# Patient Record
Sex: MALE | Race: WHITE | Employment: UNEMPLOYED | URBAN - METROPOLITAN AREA
[De-identification: names, ages, dates, MRNs, and addresses within clinical notes are randomized per-mention and may not be internally consistent; named-entity substitution may affect disease eponyms.]

---

## 2017-01-16 ENCOUNTER — TELEPHONE (OUTPATIENT)
Dept: FAMILY MEDICINE CLINIC | Age: 32
End: 2017-01-16

## 2017-01-16 ENCOUNTER — OFFICE VISIT (OUTPATIENT)
Dept: FAMILY MEDICINE CLINIC | Age: 32
End: 2017-01-16

## 2017-01-16 VITALS
BODY MASS INDEX: 32.1 KG/M2 | SYSTOLIC BLOOD PRESSURE: 126 MMHG | HEART RATE: 92 BPM | RESPIRATION RATE: 18 BRPM | HEIGHT: 64 IN | OXYGEN SATURATION: 97 % | DIASTOLIC BLOOD PRESSURE: 89 MMHG | TEMPERATURE: 97.6 F | WEIGHT: 188 LBS

## 2017-01-16 DIAGNOSIS — J45.31 RAD (REACTIVE AIRWAY DISEASE), MILD PERSISTENT, WITH ACUTE EXACERBATION: ICD-10-CM

## 2017-01-16 DIAGNOSIS — R09.89 RHONCHI: ICD-10-CM

## 2017-01-16 DIAGNOSIS — J40 BRONCHITIS: ICD-10-CM

## 2017-01-16 DIAGNOSIS — J06.9 ACUTE URI: Primary | ICD-10-CM

## 2017-01-16 RX ORDER — NEBULIZER AND COMPRESSOR
EACH MISCELLANEOUS
Qty: 1 EACH | Refills: 0 | Status: SHIPPED | OUTPATIENT
Start: 2017-01-16 | End: 2017-01-19 | Stop reason: SDUPTHER

## 2017-01-16 RX ORDER — AMOXICILLIN AND CLAVULANATE POTASSIUM 875; 125 MG/1; MG/1
1 TABLET, FILM COATED ORAL 2 TIMES DAILY
Qty: 20 TAB | Refills: 0 | Status: SHIPPED | OUTPATIENT
Start: 2017-01-16 | End: 2017-01-26

## 2017-01-16 RX ORDER — ALBUTEROL SULFATE 0.83 MG/ML
2.5 SOLUTION RESPIRATORY (INHALATION) ONCE
Qty: 1 EACH | Refills: 0
Start: 2017-01-16 | End: 2017-01-16 | Stop reason: ALTCHOICE

## 2017-01-16 RX ORDER — ALBUTEROL SULFATE 90 UG/1
1-2 AEROSOL, METERED RESPIRATORY (INHALATION)
Qty: 1 INHALER | Refills: 2 | Status: SHIPPED | OUTPATIENT
Start: 2017-01-16 | End: 2017-01-16 | Stop reason: ALTCHOICE

## 2017-01-16 RX ORDER — ALBUTEROL SULFATE 0.83 MG/ML
2.5 SOLUTION RESPIRATORY (INHALATION)
Qty: 1 PACKAGE | Refills: 1 | Status: SHIPPED | OUTPATIENT
Start: 2017-01-16

## 2017-01-16 NOTE — PROGRESS NOTES
Chief Complaint   Patient presents with    Cold Symptoms     Patient in office today for sx that caregiver is unsure of when sx started; have not treated with otc. 1. Have you been to the ER, urgent care clinic since your last visit? Hospitalized since your last visit? No    2. Have you seen or consulted any other health care providers outside of the [address] [place] [address] since your last visit? Include any pap smears or colon screening.  No

## 2017-01-16 NOTE — TELEPHONE ENCOUNTER
Spoke with [facility] advised neb machine will be ordered for patient; please monitor calls from [address] in case they would like to speak with caregiver stated she understood. Advised neb solutions have been sent to [facility];stated she understood.

## 2017-01-16 NOTE — TELEPHONE ENCOUNTER
Teodora Can called stating that the spacer prescribed for the inhaler is not being covered by insurance, she would like to know what is the alternative? Please advise her @ [phone].

## 2017-01-16 NOTE — TELEPHONE ENCOUNTER
I don't know what a covered alternative would be. He just needs a spacer due to difficulty administering medication without due to history of MR. We may need to PA the spacer.

## 2017-01-16 NOTE — MR AVS SNAPSHOT
Visit Information Date & Time Provider Department Dept. Phone Encounter #  
 1/16/2017  1:30 PM Lianna Haney NP [facility] [phone] [id number] Follow-up Instructions Return if symptoms worsen or fail to improve. Your Appointments 2/14/2017  9:30 AM  
ESTABLISHED PATIENT with Ehsancarlos Shaw MD  
[address] [facility] ([facility]) Appt Note: 6mo fu dm  
 [address] [address] [phone]  
  
   
 [address] [address] Upcoming Health Maintenance Date Due Pneumococcal 19-64 Medium Risk (1 of 1 - PPSV23) 12/2/2003 DTaP/Tdap/Td series (1 - Tdap) 12/2/2005 EYE EXAM RETINAL OR DILATED Q1 3/19/2015 HEMOGLOBIN A1C Q6M 2/16/2017 FOOT EXAM Q1 3/2/2017 MICROALBUMIN Q1 3/2/2017 LIPID PANEL Q1 8/16/2017 Allergies as of 1/16/2017  Review Complete On: 1/16/2017 By: Lianna Haney NP No Known Allergies Current Immunizations  Reviewed on 12/9/2015 Name Date PPD 3/27/2012, 3/30/2011 TB Skin Test (PPD) Intradermal 3/17/2015, 3/21/2014, 4/9/2013, 4/5/2013 Not reviewed this visit You Were Diagnosed With   
  
 Codes Comments Acute URI    -  Primary ICD-10-CM: J06.9 ICD-9-CM: 465.9 Bronchitis     ICD-10-CM: J40 ICD-9-CM: 815 Rhonchi     ICD-10-CM: R06.89 
ICD-9-CM: 256. 7 Vitals BP Pulse Temp Resp Height(growth percentile) Weight(growth percentile) 126/89 (BP 1 Location: Right arm, BP Patient Position: Sitting) 92 97.6 °F (36.4 °C) (Oral) 18 5' 4\" (1.626 m) 188 lb (85.3 kg) SpO2 BMI Smoking Status 97% 32.27 kg/m2 Never Smoker BMI and BSA Data Body Mass Index Body Surface Area  
 32.27 kg/m 2 1.96 m 2 Preferred Pharmacy Pharmacy Name Phone [facility] [place] [address] [phone] Your Updated Medication List  
  
   
 This list is accurate as of: 1/16/17  2:19 PM.  Always use your most recent med list.  
  
  
  
  
 * albuterol 2.5 mg /3 mL (0.083 %) nebulizer solution Commonly known as:  PROVENTIL VENTOLIN  
3 mL by Nebulization route once for 1 dose. * albuterol 90 mcg/actuation inhaler Commonly known as:  PROVENTIL HFA, VENTOLIN HFA, PROAIR HFA Take 1-2 Puffs by inhalation every four (4) hours as needed for Wheezing. amoxicillin-clavulanate 875-125 mg per tablet Commonly known as:  AUGMENTIN Take 1 Tab by mouth two (2) times a day for 10 days. atenolol 25 mg tablet Commonly known as:  TENORMIN  
TAKE 1 TABLET BY MOUTH DAILY  
  
 bisacodyl 5 mg EC tablet Commonly known as:  DULCOLAX (BISACODYL) Take 1 Tab by mouth daily as needed for Constipation. diphenhydrAMINE 12.5 mg/5 mL Commonly known as:  BENADRYL Take 10 mL by mouth every six (6) hours as needed for Itching. divalproex  mg tablet Commonly known as:  DEPAKOTE  
  
 docusate sodium 100 mg capsule Commonly known as:  Morris Bough Take 1 Cap by mouth two (2) times daily as needed for Constipation. fluticasone 50 mcg/actuation nasal spray Commonly known as:  Emily Clarksdale INHALE 2 SPRAYS INTO EACH NOSTRIL ONCE DAILY  
  
 guaiFENesin-dextromethorphan 100-10 mg/5 mL syrup Commonly known as:  ROBITUSSIN-DM Take 10 mL by mouth four (4) times daily as needed for Cough. hydrocortisone 1 % ointment Commonly known as:  HYCORT Apply  to affected area two (2) times a day. use thin layer  
  
 ibuprofen 600 mg tablet Commonly known as:  MOTRIN Take  by mouth every six (6) hours as needed. inhalational spacing device Commonly known as:  SPACE CHAMBER PLUS  
1 Each by Does Not Apply route as needed. For use with albuterol inhaler. lithium carbonate 300 mg capsule Take 300 mg by mouth two (2) times daily (with meals). loratadine 10 mg tablet Commonly known as:  Michaell Organ  
 TAKE 1 TABLET BY MOUTH DAILY FOR ALLERGIES  
  
 magnesium hydroxide 400 mg/5 mL suspension Commonly known as:  MILK OF MAGNESIA Take 30 mL by mouth daily as needed. MYLANTA MAXIMUM STRENGTH 400-400-40 mg/5 mL suspension Generic drug:  aluminum & magnesium hydroxide-simethicone Take 10 mL by mouth every six (6) hours as needed. ondansetron hcl 4 mg tablet Commonly known as:  Tamara Footman Take 4 mg by mouth every six (6) hours. oxyCODONE-acetaminophen 5-325 mg per tablet Commonly known as:  PERCOCET Take 2 tablets by mouth every four (4) hours as needed for Pain. pramoxine-mineral oil-zinc 1-46.6-12.5 % rectal ointment Commonly known as:  TUCKS  
by PeriANAL route every four (4) hours as needed for Hemorrhoids, Pain and Itching. risperiDONE 4 mg tablet Commonly known as:  RisperDAL Take 2 mg by mouth daily. Senna 8.6 mg Cap Generic drug:  sennosides Take 1 tablet by mouth nightly as needed. temazepam 15 mg capsule Commonly known as:  RESTORIL Take  by mouth nightly as needed. TYLENOL 325 mg tablet Generic drug:  acetaminophen Take  by mouth every four (4) hours as needed. * Notice: This list has 2 medication(s) that are the same as other medications prescribed for you. Read the directions carefully, and ask your doctor or other care provider to review them with you. Prescriptions Sent to Pharmacy Refills  
 albuterol (PROVENTIL HFA, VENTOLIN HFA, PROAIR HFA) 90 mcg/actuation inhaler 2 Sig: Take 1-2 Puffs by inhalation every four (4) hours as needed for Wheezing. Class: Normal  
 Pharmacy: [address] [place] [address] #: [phone] Route: Inhalation  
 inhalational spacing device (SPACE CHAMBER PLUS) 1 Si Each by Does Not Apply route as needed. For use with albuterol inhaler.   
 Class: Normal  
 Pharmacy: [address] [place] [address], [address] [place] [address] [facility] #: [phone] Route: Does Not Apply  
 amoxicillin-clavulanate (AUGMENTIN) 875-125 mg per tablet 0 Sig: Take 1 Tab by mouth two (2) times a day for 10 days. Class: Normal  
 Pharmacy: [address] [place] [address], [address] Ph #: [phone] Route: Oral  
  
We Performed the Following ALBUTEROL, INHAL. SOL., FDA-APPROVED FINAL, NON-COMPOUND UNIT DOSE, 1 MG [ \[facility]\""] INHAL RX, AIRWAY OBST/DX SPUTUM INDUCT [id number] CPT(R)] Follow-up Instructions Return if symptoms worsen or fail to improve. Introducing [facility]! Saurabh Good introduces [employer] patient portal. Now you can access parts of your medical record, email your doctor's office, and request medication refills online. 1. In your internet browser, go to [email or web address] 2. Click on the First Time User? Click Here link in the Sign In box. You will see the New Member Sign Up page. 3. Enter your [employer] Access Code exactly as it appears below. You will not need to use this code after youve completed the sign-up process. If you do not sign up before the expiration date, you must request a new code. · [employer] Access Code: [email or web address] Expires: 3/15/2017  2:20 PM 
 
4. Enter the last four digits of your Social Security Number (xxxx) and Date of Birth (mm/dd/yyyy) as indicated and click Submit. You will be taken to the next sign-up page. 5. Create a [employer] ID. This will be your [employer] login ID and cannot be changed, so think of one that is secure and easy to remember. 6. Create a [employer] password. You can change your password at any time. 7. Enter your Password Reset Question and Answer. This can be used at a later time if you forget your password. 8. Enter your e-mail address. You will receive e-mail notification when new information is available in [address] 9. Click Sign Up. You can now view and download portions of your medical record. 10. Click the Download Summary menu link to download a portable copy of your medical information. If you have questions, please visit the Frequently Asked Questions section of the [employer] website. Remember, [employer] is NOT to be used for urgent needs. For medical emergencies, dial 911. Now available from your iPhone and Android! Please provide this summary of care documentation to your next provider. Your primary care clinician is listed as LULU HARPER. If you have any questions after today's visit, please call [phone].

## 2017-01-16 NOTE — PROGRESS NOTES
Chief Complaint   Patient presents with    Cold Symptoms     Patient in office today for sx that caregiver is unsure of when sx started; have not treated with otc. 1. Have you been to the ER, urgent care clinic since your last visit? Hospitalized since your last visit? No    2. Have you seen or consulted any other health care providers outside of the [address] [place] [address] since your last visit? Include any pap smears or colon screening. No    Denies any fever. Denies any PRNs. Sx started on Thursday with congestion and cough. Denies any recent abx. Denies any other concerns at this time. Chief Complaint   Patient presents with    Cold Symptoms     he is a 27y.o. year old male who presents for evalution. Reviewed PmHx, RxHx, FmHx, SocHx, AllgHx and updated and dated in the chart. Review of Systems - negative except as listed above in the HPI    Objective:     Vitals:    01/16/17 1340   BP: 126/89   Pulse: 92   Resp: 18   Temp: 97.6 °F (36.4 °C)   TempSrc: Oral   SpO2: 97%   Weight: 188 lb (85.3 kg)   Height: 5' 4\" (1.626 m)     Physical Examination: General appearance - alert, well appearing, and in no distress  Eyes - pupils equal and reactive, extraocular eye movements intact  Ears - bilateral TM's and external ear canals normal  Nose - mucosal congestion, mucosal erythema and purulent rhinorrhea  Mouth - mucous membranes moist, pharynx normal without lesions  Neck - supple, no significant adenopathy  Chest - no tachypnea, retractions or cyanosis, rhonchi noted on expiration throughout all lung fields, productive sounding cough; poor lung excursion due to pt not being able to take a deep breath on command  Heart - normal rate, regular rhythm, normal S1, S2, no murmurs    Assessment/ Plan:   Nuzhat Hinojosa was seen today for cold symptoms. Diagnoses and all orders for this visit:    Acute URI / Bronchitis  -     amoxicillin-clavulanate (AUGMENTIN) 875-125 mg per tablet;  Take 1 Tab by mouth two (2) times a day for 10 days. Start and complete full course of augmentin. Dwp ADRs/SEs of medication. Push fluids. Rest. Saline nasal spray for nasal congestion. OTC motrin/apap for fevers. RTC if sx persist or worsen. Rhonchi  -     Discontinue: albuterol (PROVENTIL VENTOLIN) 2.5 mg /3 mL (0.083 %) nebulizer solution; 3 mL by Nebulization route once for 1 dose. -     ALBUTEROL, INHAL. DSQ.()  -     INHAL RX, AIRWAY OBST/DX SPUTUM INDUCT ([id number])  Given. Tolerated breathing treatment. Reactive airway disease, mild persistent, with acute exacerbation   -     Nebulizer & Compressor machine; For the administration of albuterol every 4 hours as needed for wheezing. Diagnosis code R06.2.  -     albuterol (PROVENTIL VENTOLIN) 2.5 mg /3 mL (0.083 %) nebulizer solution; 3 mL by Nebulization route every four (4) hours as needed for Wheezing. Use as directed. Follow-up Disposition:  Return if symptoms worsen or fail to improve. I have discussed the diagnosis with the patient and the intended plan as seen in the above orders. The patient has received an after-visit summary and questions were answered concerning future plans. Medication Side Effects and Warnings were discussed with patient: yes  Patient Labs were reviewed and or requested: yes  Patient Past Records were reviewed and or requested  yes  Patient / Caregiver Understanding of treatment plan was verbalized during office visit YES    GOKUL CastellanosC    There are no Patient Instructions on file for this visit.

## 2017-01-18 ENCOUNTER — TELEPHONE (OUTPATIENT)
Dept: FAMILY MEDICINE CLINIC | Age: 32
End: 2017-01-18

## 2017-01-18 NOTE — TELEPHONE ENCOUNTER
Ms Buenrostro from [facility] states patient has not received nebullizer. Nikko Matos told he they needed more notes to get nebulizer stating why patient needs a nebulize. Fax to Nikko Matos.  Any questions call Ms Jass Puckett @[phone]

## 2017-01-18 NOTE — TELEPHONE ENCOUNTER
[address] called. Medicare will not pay for the neb if the diagnosis is wheezing. PSR asked if acute bronchitis will work, he stated no unless it was chronic bronchitis. PSR asked \"So Medicare will cover it for long term diagnosis and not temporary? \" He said correct. They will  Cover it for Chronic Bronchitis, COPD, Asthma, etc. They also need OV notes sent to send to Medicare as well.

## 2017-01-19 RX ORDER — NEBULIZER AND COMPRESSOR
EACH MISCELLANEOUS
Qty: 1 EACH | Refills: 0 | Status: SHIPPED | OUTPATIENT
Start: 2017-01-19

## 2017-01-19 NOTE — TELEPHONE ENCOUNTER
Spoke with  gave updated information on current status of order; notified revised order has been sent to [facility] with office notes; neb machine should be sent to home soon;stated she understood.

## 2017-01-19 NOTE — TELEPHONE ENCOUNTER
Spoke with [facility] stated insurance will accept RAD; advised need office notes faxed over with provider signature and date on notes, also rx script needs to have a written date; advised will also send [employer] form completed and signed to [facility] office;rep stated he understood. Form has been placed on provider desk.

## 2017-01-24 ENCOUNTER — DOCUMENTATION ONLY (OUTPATIENT)
Dept: FAMILY MEDICINE CLINIC | Age: 32
End: 2017-01-24

## 2017-01-31 RX ORDER — ATENOLOL 25 MG/1
TABLET ORAL
Qty: 31 TAB | Status: SHIPPED | OUTPATIENT
Start: 2017-01-31 | End: 2018-01-30 | Stop reason: SDUPTHER

## 2017-02-16 ENCOUNTER — OFFICE VISIT (OUTPATIENT)
Dept: FAMILY MEDICINE CLINIC | Age: 32
End: 2017-02-16

## 2017-02-16 VITALS
OXYGEN SATURATION: 98 % | WEIGHT: 186 LBS | HEART RATE: 88 BPM | TEMPERATURE: 97.8 F | BODY MASS INDEX: 31.76 KG/M2 | DIASTOLIC BLOOD PRESSURE: 79 MMHG | SYSTOLIC BLOOD PRESSURE: 130 MMHG | RESPIRATION RATE: 18 BRPM | HEIGHT: 64 IN

## 2017-02-16 DIAGNOSIS — I10 ESSENTIAL HYPERTENSION: ICD-10-CM

## 2017-02-16 DIAGNOSIS — E11.9 CONTROLLED TYPE 2 DIABETES MELLITUS WITHOUT COMPLICATION, WITHOUT LONG-TERM CURRENT USE OF INSULIN (HCC): Primary | Chronic | ICD-10-CM

## 2017-02-16 DIAGNOSIS — F79 MR (MENTAL RETARDATION): Chronic | ICD-10-CM

## 2017-02-16 NOTE — MR AVS SNAPSHOT
Visit Information Date & Time Provider Department Dept. Phone Encounter #  
 2/16/2017  9:30 AM Allen Bone MD [address] [facility] [phone] [id number] Upcoming Health Maintenance Date Due Pneumococcal 19-64 Medium Risk (1 of 1 - PPSV23) 12/2/2003 DTaP/Tdap/Td series (1 - Tdap) 12/2/2005 EYE EXAM RETINAL OR DILATED Q1 3/19/2015 HEMOGLOBIN A1C Q6M 2/16/2017 FOOT EXAM Q1 3/2/2017 MICROALBUMIN Q1 3/2/2017 LIPID PANEL Q1 8/16/2017 Allergies as of 2/16/2017  Review Complete On: 2/16/2017 By: Allen Bone MD  
 No Known Allergies Current Immunizations  Reviewed on 12/9/2015 Name Date PPD 3/27/2012, 3/30/2011 TB Skin Test (PPD) Intradermal 3/17/2015, 3/21/2014, 4/9/2013, 4/5/2013 Not reviewed this visit You Were Diagnosed With   
  
 Codes Comments Controlled type 2 diabetes mellitus without complication, without long-term current use of insulin ([facility]    -  Primary ICD-10-CM: E11.9 ICD-9-CM: 250.00 Essential hypertension     ICD-10-CM: I10 
ICD-9-CM: 401.9 MR (mental retardation)     ICD-10-CM: G46 
ICD-9-CM: 042 Vitals BP Pulse Temp Resp Height(growth percentile) Weight(growth percentile) 130/79 (BP 1 Location: Left arm, BP Patient Position: Sitting) 88 97.8 °F (36.6 °C) (Oral) 18 5' 4\" (1.626 m) 186 lb (84.4 kg) SpO2 BMI Smoking Status 98% 31.93 kg/m2 Never Smoker Vitals History BMI and BSA Data Body Mass Index Body Surface Area  
 31.93 kg/m 2 1.95 m 2 Preferred Pharmacy Pharmacy Name Phone [facility], [address] [phone] Your Updated Medication List  
  
   
This list is accurate as of: 2/16/17 10:05 AM.  Always use your most recent med list.  
  
  
  
  
 albuterol 2.5 mg /3 mL (0.083 %) nebulizer solution Commonly known as:  PROVENTIL VENTOLIN  
 3 mL by Nebulization route every four (4) hours as needed for Wheezing. atenolol 25 mg tablet Commonly known as:  TENORMIN  
TAKE 1 TABLET BY MOUTH DAILY  
  
 bisacodyl 5 mg EC tablet Commonly known as:  DULCOLAX (BISACODYL) Take 1 Tab by mouth daily as needed for Constipation. diphenhydrAMINE 12.5 mg/5 mL Commonly known as:  BENADRYL Take 10 mL by mouth every six (6) hours as needed for Itching. divalproex  mg tablet Commonly known as:  DEPAKOTE  
  
 docusate sodium 100 mg capsule Commonly known as:  Ethyl Hoit Take 1 Cap by mouth two (2) times daily as needed for Constipation. fluticasone 50 mcg/actuation nasal spray Commonly known as:  Alric Eaves INHALE 2 SPRAYS INTO EACH NOSTRIL ONCE DAILY  
  
 guaiFENesin-dextromethorphan 100-10 mg/5 mL syrup Commonly known as:  ROBITUSSIN-DM Take 10 mL by mouth four (4) times daily as needed for Cough. hydrocortisone 1 % ointment Commonly known as:  HYCORT Apply  to affected area two (2) times a day. use thin layer  
  
 ibuprofen 600 mg tablet Commonly known as:  MOTRIN Take  by mouth every six (6) hours as needed. inhalational spacing device Commonly known as:  SPACE CHAMBER PLUS  
1 Each by Does Not Apply route as needed. For use with albuterol inhaler. lithium carbonate 300 mg capsule Take 300 mg by mouth two (2) times daily (with meals). loratadine 10 mg tablet Commonly known as:  CLARITIN  
TAKE 1 TABLET BY MOUTH DAILY FOR ALLERGIES  
  
 magnesium hydroxide 400 mg/5 mL suspension Commonly known as:  MILK OF MAGNESIA Take 30 mL by mouth daily as needed. MYLANTA MAXIMUM STRENGTH 400-400-40 mg/5 mL suspension Generic drug:  aluminum & magnesium hydroxide-simethicone Take 10 mL by mouth every six (6) hours as needed. Nebulizer & Compressor machine For the administration of albuterol every 4 hours as needed for wheezing.  Diagnosis code J45.31.  
  
 ondansetron hcl 4 mg tablet Commonly known as:  Earlie Robison Take 4 mg by mouth every six (6) hours. oxyCODONE-acetaminophen 5-325 mg per tablet Commonly known as:  PERCOCET Take 2 tablets by mouth every four (4) hours as needed for Pain. pramoxine-mineral oil-zinc 1-46.6-12.5 % rectal ointment Commonly known as:  TUCKS  
by PeriANAL route every four (4) hours as needed for Hemorrhoids, Pain and Itching. risperiDONE 4 mg tablet Commonly known as:  RisperDAL Take 2 mg by mouth daily. Senna 8.6 mg Cap Generic drug:  sennosides Take 1 tablet by mouth nightly as needed. temazepam 15 mg capsule Commonly known as:  RESTORIL Take  by mouth nightly as needed. TYLENOL 325 mg tablet Generic drug:  acetaminophen Take  by mouth every four (4) hours as needed. We Performed the Following CBC WITH AUTOMATED DIFF [87059 CPT(R)] HEMOGLOBIN A1C WITH EAG [55367 CPT(R)] LIPID PANEL [47789 CPT(R)] LITHIUM [id number] CPT(R)] METABOLIC PANEL, COMPREHENSIVE [42995 CPT(R)] TSH 3RD GENERATION [33258 CPT(R)] Introducing [facility]! Renae Lindquist introduces Built [employer] patient portal. Now you can access parts of your medical record, email your doctor's office, and request medication refills online. 1. In your internet browser, go to [email or web address] 2. Click on the First Time User? Click Here link in the Sign In box. You will see the New Member Sign Up page. 3. Enter your Built In Access Code exactly as it appears below. You will not need to use this code after youve completed the sign-up process. If you do not sign up before the expiration date, you must request a new code. · Built In Access Code: [email or web address] Expires: 3/15/2017  2:20 PM 
 
4. Enter the last four digits of your Social Security Number (xxxx) and Date of Birth (mm/dd/yyyy) as indicated and click Submit. You will be taken to the next sign-up page. 5. Create a [employer] ID. This will be your [employer] login ID and cannot be changed, so think of one that is secure and easy to remember. 6. Create a [employer] password. You can change your password at any time. 7. Enter your Password Reset Question and Answer. This can be used at a later time if you forget your password. 8. Enter your e-mail address. You will receive e-mail notification when new information is available in [address] 9. Click Sign Up. You can now view and download portions of your medical record. 10. Click the Download Summary menu link to download a portable copy of your medical information. If you have questions, please visit the Frequently Asked Questions section of the [employer] website. Remember, [employer] is NOT to be used for urgent needs. For medical emergencies, dial 911. Now available from your iPhone and Android! Please provide this summary of care documentation to your next provider. Your primary care clinician is listed as LULU HARPER. If you have any questions after today's visit, please call [phone].

## 2017-02-16 NOTE — PROGRESS NOTES
Pt here with group home counselor for routine DM check. Pt is fasting this AM for lab work. Counselor denies having any concerns at this time.

## 2017-02-16 NOTE — PROGRESS NOTES
Pt here with group home counselor for routine DM check. Pt is fasting this AM for lab work. Counselor denies having any concerns at this time. Subjective: (As above and below)     Chief Complaint   Patient presents with    Diabetes     he is a 27y.o. year old male who presents for evaluation. Reviewed PmHx, RxHx, FmHx, SocHx, AllgHx and updated in chart. Review of Systems - negative except as listed above    Objective:     Vitals:    02/16/17 0942   BP: 130/79   Pulse: 88   Resp: 18   Temp: 97.8 °F (36.6 °C)   TempSrc: Oral   SpO2: 98%   Weight: 186 lb (84.4 kg)   Height: 5' 4\" (1.626 m)     Physical Examination: General appearance - alert, well appearing, and in no distress  Mental status - nonverbal  Mouth - mucous membranes moist, pharynx normal without lesions  Chest - clear to auscultation, no wheezes, rales or rhonchi, symmetric air entry  Heart - normal rate, regular rhythm, normal S1, S2, no murmurs, rubs, clicks or gallops  Musculoskeletal - no joint tenderness, deformity or swelling  Extremities - peripheral pulses normal, no pedal edema, no clubbing or cyanosis    Assessment/ Plan:   1. Controlled type 2 diabetes mellitus without complication, without long-term current use of insulin ([facility])  -check fasting labs  - CBC WITH AUTOMATED DIFF  - LIPID PANEL  - METABOLIC PANEL, COMPREHENSIVE  - TSH 3RD GENERATION  - HEMOGLOBIN A1C WITH EAG  - LITHIUM    2. Essential hypertension  -well controlled    3. MR (mental retardation)  -baseline for pt     Follow-up Disposition: as needed  I have discussed the diagnosis with the patient and the intended plan as seen in the above orders. The patient has received an after-visit summary and questions were answered concerning future plans.      Medication Side Effects and Warnings were discussed with patient: yes  Patient Labs were reviewed: yes  Patient Past Records were reviewed:  yes    Nadya Velasco M.D.

## 2017-02-17 LAB
ALBUMIN SERPL-MCNC: 4.3 G/DL (ref 3.5–5.5)
ALBUMIN/GLOB SERPL: 1.6 {RATIO} (ref 1.1–2.5)
ALP SERPL-CCNC: 95 IU/L (ref 39–117)
ALT SERPL-CCNC: 14 IU/L (ref 0–44)
AST SERPL-CCNC: 14 IU/L (ref 0–40)
BASOPHILS # BLD AUTO: 0 X10E3/UL (ref 0–0.2)
BASOPHILS NFR BLD AUTO: 0 %
BILIRUB SERPL-MCNC: 0.5 MG/DL (ref 0–1.2)
BUN SERPL-MCNC: 11 MG/DL (ref 6–20)
BUN/CREAT SERPL: 15 (ref 8–19)
CALCIUM SERPL-MCNC: 9.7 MG/DL (ref 8.7–10.2)
CHLORIDE SERPL-SCNC: 105 MMOL/L (ref 96–106)
CHOLEST SERPL-MCNC: 136 MG/DL (ref 100–199)
CO2 SERPL-SCNC: 22 MMOL/L (ref 18–29)
CREAT SERPL-MCNC: 0.73 MG/DL (ref 0.76–1.27)
EOSINOPHIL # BLD AUTO: 0.1 X10E3/UL (ref 0–0.4)
EOSINOPHIL NFR BLD AUTO: 1 %
ERYTHROCYTE [DISTWIDTH] IN BLOOD BY AUTOMATED COUNT: 13.5 % (ref 12.3–15.4)
EST. AVERAGE GLUCOSE BLD GHB EST-MCNC: 126 MG/DL
GLOBULIN SER CALC-MCNC: 2.7 G/DL (ref 1.5–4.5)
GLUCOSE SERPL-MCNC: 115 MG/DL (ref 65–99)
HBA1C MFR BLD: 6 % (ref 4.8–5.6)
HCT VFR BLD AUTO: 46.2 % (ref 37.5–51)
HDLC SERPL-MCNC: 29 MG/DL
HGB BLD-MCNC: 15.4 G/DL (ref 12.6–17.7)
IMM GRANULOCYTES # BLD: 0 X10E3/UL (ref 0–0.1)
IMM GRANULOCYTES NFR BLD: 1 %
INTERPRETATION, 910389: NORMAL
LDLC SERPL CALC-MCNC: 84 MG/DL (ref 0–99)
LITHIUM SERPL-SCNC: 0.5 MMOL/L (ref 0.6–1.4)
LYMPHOCYTES # BLD AUTO: 1.8 X10E3/UL (ref 0.7–3.1)
LYMPHOCYTES NFR BLD AUTO: 32 %
Lab: NORMAL
MCH RBC QN AUTO: 30.1 PG (ref 26.6–33)
MCHC RBC AUTO-ENTMCNC: 33.3 G/DL (ref 31.5–35.7)
MCV RBC AUTO: 90 FL (ref 79–97)
MONOCYTES # BLD AUTO: 0.3 X10E3/UL (ref 0.1–0.9)
MONOCYTES NFR BLD AUTO: 5 %
NEUTROPHILS # BLD AUTO: 3.5 X10E3/UL (ref 1.4–7)
NEUTROPHILS NFR BLD AUTO: 61 %
PLATELET # BLD AUTO: 163 X10E3/UL (ref 150–379)
POTASSIUM SERPL-SCNC: 4.3 MMOL/L (ref 3.5–5.2)
PROT SERPL-MCNC: 7 G/DL (ref 6–8.5)
RBC # BLD AUTO: 5.11 X10E6/UL (ref 4.14–5.8)
SODIUM SERPL-SCNC: 142 MMOL/L (ref 134–144)
TRIGL SERPL-MCNC: 113 MG/DL (ref 0–149)
TSH SERPL DL<=0.005 MIU/L-ACNC: 0.68 UIU/ML (ref 0.45–4.5)
VLDLC SERPL CALC-MCNC: 23 MG/DL (ref 5–40)
WBC # BLD AUTO: 5.6 X10E3/UL (ref 3.4–10.8)

## 2017-02-17 NOTE — PROGRESS NOTES
Increase in risk for diabetes, please closely monitor diet and increase exercise   Lithium level low  All other labs are within normal limits.    Please inform

## 2017-03-02 RX ORDER — LORATADINE 10 MG/1
TABLET ORAL
Qty: 28 TAB | Status: SHIPPED | OUTPATIENT
Start: 2017-03-02 | End: 2018-03-05 | Stop reason: SDUPTHER

## 2017-03-28 ENCOUNTER — OFFICE VISIT (OUTPATIENT)
Dept: FAMILY MEDICINE CLINIC | Age: 32
End: 2017-03-28

## 2017-03-28 ENCOUNTER — HOSPITAL ENCOUNTER (OUTPATIENT)
Dept: LAB | Age: 32
Discharge: HOME | End: 2017-03-28
Payer: MEDICARE

## 2017-03-28 VITALS
SYSTOLIC BLOOD PRESSURE: 127 MMHG | DIASTOLIC BLOOD PRESSURE: 82 MMHG | WEIGHT: 189 LBS | RESPIRATION RATE: 18 BRPM | HEIGHT: 64 IN | TEMPERATURE: 97.9 F | HEART RATE: 72 BPM | BODY MASS INDEX: 32.27 KG/M2 | OXYGEN SATURATION: 98 %

## 2017-03-28 DIAGNOSIS — Z11.1 SCREENING-PULMONARY TB: ICD-10-CM

## 2017-03-28 DIAGNOSIS — Z00.00 ROUTINE GENERAL MEDICAL EXAMINATION AT A HEALTH CARE FACILITY: Primary | ICD-10-CM

## 2017-03-28 PROCEDURE — 86480 TB TEST CELL IMMUN MEASURE: CPT

## 2017-03-28 NOTE — LETTER
4/3/2017 9:23 AM 
 
Mr. Nydia Vance [place] [id number] Dear Nydia Vance: 
 
Please find your most recent results below. Resulted Orders QUANTIFERON TB GOLD Result Value Ref Range QuantiFERON Incubation Incubated, specimen forwarded to [place], [place] for 
completion of the assay. Narrative Performed at:  [address]  [id number] : Gildardo Villar MD, Phone:  [phone] QUANTIFERON IN TUBE REFL Result Value Ref Range QuantiFERON TB Gold Negative Negative QUANTIFERON CRITERIA Comment Comment: To be considered positive a specimen should have a TB Ag minus Nil 
value greater than or equal to 0.35 IU/mL and in addition the TB Ag 
minus Nil value must be greater than or equal to 25% of the Nil 
value. There may be insufficient information in these values to 
differentiate between some negative and some indeterminate test 
values. QuantiFERON TB Ag Value 0.09 IU/mL QuantiFERON Nil Value 0.06 IU/mL QuantiFERON Mitogen Value 9.04 IU/mL  
 QFT TB Ag minus Nil Value 0.03 IU/mL Interpretation: Comment Comment:  
   The QuantiFERON TB Gold (in Tube) assay is intended for use as an aid 
in the diagnosis of TB infection. Negative results suggest that there 
is no TB infection. In patients with high suspicion of exposure, a 
negative test should be repeated. A positive test indicates infection 
with Mycobacterium tuberculosis. Among individuals without 
tuberculosis infection, a positive test may be due to exposure to 
[place], M. szulgai or M. marinum. On the Internet, go to 
cdc.gov/tb for further details. Narrative Performed at:  [address]  [id number] : Gildardo Villar MD, Phone:  [phone] RECOMMENDATIONS: 
TB screening is NEGATIVE. Please call me if you have any questions: [phone] Sincerely, Tricia Kaplan MD

## 2017-03-28 NOTE — PROGRESS NOTES
Pt here with group home counselor for routine physical.  Requesting to have TB screening as well. Pt is not fasting this AM for lab work.

## 2017-03-28 NOTE — PROGRESS NOTES
Pt here with group home counselor for routine physical.  Requesting to have TB screening as well. Pt is not fasting this AM for lab work. This is a Subsequent Medicare Annual Wellness Visit providing Personalized Prevention Plan Services (PPPS) (Performed 12 months after initial AWV and PPPS )    I have reviewed the patient's medical history in detail and updated the computerized patient record. History     Past Medical History:   Diagnosis Date    Allergic rhinitis due to other allergen 3/24/2010    MR (mental retardation) 3/24/2010    Type II or unspecified type diabetes mellitus without mention of complication, not stated as uncontrolled 3/24/2010    Unspecified asthma(493.90) 3/24/2010      History reviewed. No pertinent surgical history. Current Outpatient Prescriptions   Medication Sig Dispense Refill    loratadine (CLARITIN) 10 mg tablet TAKE 1 TABLET BY MOUTH DAILY FOR ALLERGIES 28 Tab PRN    atenolol (TENORMIN) 25 mg tablet TAKE 1 TABLET BY MOUTH DAILY 31 Tab PRN    Nebulizer & Compressor machine For the administration of albuterol every 4 hours as needed for wheezing. Diagnosis code J45.31. 1 Each 0    inhalational spacing device (SPACE CHAMBER PLUS) 1 Each by Does Not Apply route as needed. For use with albuterol inhaler. 1 Each 1    albuterol (PROVENTIL VENTOLIN) 2.5 mg /3 mL (0.083 %) nebulizer solution 3 mL by Nebulization route every four (4) hours as needed for Wheezing. 1 Package 1    fluticasone (FLONASE) 50 mcg/actuation nasal spray INHALE 2 SPRAYS INTO EACH NOSTRIL ONCE DAILY 16 g 5    divalproex DR (DEPAKOTE) 250 mg tablet       ondansetron hcl (ZOFRAN) 4 mg tablet Take 4 mg by mouth every six (6) hours.  sennosides (SENNA) 8.6 mg cap Take 1 tablet by mouth nightly as needed.  oxyCODONE-acetaminophen (PERCOCET) 5-325 mg per tablet Take 2 tablets by mouth every four (4) hours as needed for Pain.       hydrocortisone (HYCORT) 1 % ointment Apply  to affected area two (2) times a day. use thin layer 30 g 0    diphenhydrAMINE (BENADRYL) 12.5 mg/5 mL Take 10 mL by mouth every six (6) hours as needed for Itching. 1 Bottle 0    risperiDONE (RISPERDAL) 4 mg tablet Take 2 mg by mouth daily.  bisacodyl (DULCOLAX, BISACODYL,) 5 mg EC tablet Take 1 Tab by mouth daily as needed for Constipation. 30 Tab 5    docusate sodium (COLACE) 100 mg capsule Take 1 Cap by mouth two (2) times daily as needed for Constipation. 62 Cap PRN    dextromethorphan-guaiFENesin (ROBITUSSIN-DM)  mg/5 mL syrup Take 10 mL by mouth four (4) times daily as needed for Cough. 200 mL 1    pramoxine-mineral oil-zinc (TUCKS) 1-12.5 % rectal ointment by PeriANAL route every four (4) hours as needed for Hemorrhoids, Pain and Itching. 30 g 0    lithium carbonate (ESKALITH) 300 mg capsule Take 300 mg by mouth two (2) times daily (with meals).  temazepam (RESTORIL) 15 mg capsule Take  by mouth nightly as needed.  magnesium hydroxide (MILK OF MAGNESIA) 400 mg/5 mL suspension Take 30 mL by mouth daily as needed.  ibuprofen (MOTRIN) 600 mg tablet Take  by mouth every six (6) hours as needed.  aluminum & magnesium hydroxide-simethicone (MYLANTA MAXIMUM STRENGTH) 400-400-40 mg/5 mL suspension Take 10 mL by mouth every six (6) hours as needed.  acetaminophen (TYLENOL) 325 mg tablet Take  by mouth every four (4) hours as needed.        No Known Allergies  Family History   Problem Relation Age of Onset    Family history unknown: Yes     Social History   Substance Use Topics    Smoking status: Never Smoker    Smokeless tobacco: Never Used    Alcohol use No     Patient Active Problem List   Diagnosis Code    MR (mental retardation) F79    Allergic rhinitis due to other allergen J30.89    Unspecified asthma(493.90)     Diabetes mellitus type 2, controlled ([address] E11.9    HTN (hypertension) I10    Intermittent explosive disorder F63.81       Depression Risk Factor Screening:     PHQ 2 / 9, over the last two weeks 12/15/2016   Little interest or pleasure in doing things Not at all   Feeling down, depressed or hopeless Not at all   Total Score PHQ 2 0     Alcohol Risk Factor Screening:   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 Requires assistance with: bathing and hygiene, feeding, continence, grooming, toileting and dressing    Fall Risk   No flowsheet data found. Abuse Screen   Patient is not abused    Review of Systems   A comprehensive review of systems was negative except for that written in the HPI. Physical Examination     Evaluation of Cognitive Function:  Mood/affect:  neutral  Appearance: age appropriate  Family member/caregiver input: pt lives in a group home    Visit Vitals    /82 (BP 1 Location: Left arm, BP Patient Position: Sitting)    Pulse 72    Temp 97.9 °F (36.6 °C) (Oral)    Resp 18    Ht 5' 4\" (1.626 m)    Wt 189 lb (85.7 kg)    SpO2 98%    BMI 32.44 kg/m2     General appearance: alert, cooperative, no distress, appears stated age  Head: Normocephalic, without obvious abnormality, atraumatic  Throat: Lips, mucosa, and tongue normal. Teeth and gums normal  Lungs: clear to auscultation bilaterally  Heart: regular rate and rhythm, S1, S2 normal, no murmur, click, rub or gallop  Extremities: extremities normal, atraumatic, no cyanosis or edema  Walks hunched over  Patient Care Team:  Jeny Christianson MD as PCP - [place] [address]    Advice/Referrals/Counseling   Education and counseling provided:  Are appropriate based on today's review and evaluation      Assessment/Plan       ICD-10-CM ICD-9-CM    1. Routine general medical examination at a health care facility Z00.00 V70.0    2. Screening-pulmonary TB Z11.1 V74.1 QUANTIFERON TB GOLD     Encounter Diagnoses   Name Primary?     Routine general medical examination at a health care facility Yes    Screening-pulmonary TB      Orders Placed This Encounter    QUANTIFERON TB GOLD   .

## 2017-03-29 RX ORDER — FLUTICASONE PROPIONATE 50 MCG
SPRAY, SUSPENSION (ML) NASAL
Qty: 16 G | Refills: 0 | Status: SHIPPED | OUTPATIENT
Start: 2017-03-29 | End: 2017-05-16 | Stop reason: SDUPTHER

## 2017-03-31 LAB
ANNOTATION COMMENT IMP: NORMAL
GAMMA INTERFERON BACKGROUND BLD IA-ACNC: 0.06 IU/ML
M TB IFN-G BLD-IMP: NEGATIVE
M TB IFN-G CD4+ BCKGRND COR BLD-ACNC: 0.03 IU/ML
M TB IFN-G CD4+ T-CELLS BLD-ACNC: 0.09 IU/ML
MITOGEN IGNF BLD-ACNC: 9.04 IU/ML
QUANTIFERON INCUBATION: NORMAL
SERVICE CMNT-IMP: NORMAL

## 2017-04-03 NOTE — PROGRESS NOTES
Notified group home counselor. Copy of result faxed to group [place] at [phone] as requested.

## 2017-05-16 RX ORDER — FLUTICASONE PROPIONATE 50 MCG
SPRAY, SUSPENSION (ML) NASAL
Qty: 16 G | Refills: 11 | Status: SHIPPED | OUTPATIENT
Start: 2017-05-16 | End: 2018-05-31 | Stop reason: SDUPTHER

## 2017-06-30 ENCOUNTER — PATIENT OUTREACH (OUTPATIENT)
Dept: FAMILY MEDICINE CLINIC | Age: 32
End: 2017-06-30

## 2017-06-30 NOTE — PROGRESS NOTES
6/30/17, Patient listed on Daily Census/MSSP Report with alert of [facility]/ED visit. This writer/NN and [facility] access, able to confirm, patient seen at [facility]/ED, 6/30/17 related to Vomiting and Diarrhea, Gastroenteritis. Printed ED Provider Note for Dr Canela to review, as patient seen recently for Annual Wellness, 3/28/17. Patient lives in [address] [place] [address], (MR, S/P  Shunt and H/O Diabetes, 2/17/2017, A1C at 6.0). 6/30/17, This writer/NN contacted [address] [place] [address], states patient was in ED only, back home, no further concerns at this time. Instruction provided on importance of F/U appts, reminder of [facility]/Walk-in hours on Mondays 7am-9am. Caregiver verbalizes understanding, agrees to monitor, call as needed and will have Supervisor call to schedule F/U on Monday. This writer/NN agrees to provide update to Dr Canela and remain available for further NN needs.

## 2017-08-14 ENCOUNTER — OFFICE VISIT (OUTPATIENT)
Dept: FAMILY MEDICINE CLINIC | Age: 32
End: 2017-08-14

## 2017-08-14 ENCOUNTER — HOSPITAL ENCOUNTER (OUTPATIENT)
Dept: LAB | Age: 32
Discharge: HOME | End: 2017-08-14
Payer: MEDICARE

## 2017-08-14 VITALS
SYSTOLIC BLOOD PRESSURE: 115 MMHG | HEIGHT: 64 IN | TEMPERATURE: 98.6 F | BODY MASS INDEX: 30.93 KG/M2 | OXYGEN SATURATION: 95 % | DIASTOLIC BLOOD PRESSURE: 82 MMHG | HEART RATE: 70 BPM | WEIGHT: 181.2 LBS | RESPIRATION RATE: 20 BRPM

## 2017-08-14 DIAGNOSIS — I10 ESSENTIAL HYPERTENSION: ICD-10-CM

## 2017-08-14 DIAGNOSIS — E11.9 CONTROLLED TYPE 2 DIABETES MELLITUS WITHOUT COMPLICATION, WITHOUT LONG-TERM CURRENT USE OF INSULIN (HCC): Primary | Chronic | ICD-10-CM

## 2017-08-14 DIAGNOSIS — F79 MR (MENTAL RETARDATION): Chronic | ICD-10-CM

## 2017-08-14 PROCEDURE — 83036 HEMOGLOBIN GLYCOSYLATED A1C: CPT

## 2017-08-14 PROCEDURE — 82043 UR ALBUMIN QUANTITATIVE: CPT

## 2017-08-14 PROCEDURE — 80053 COMPREHEN METABOLIC PANEL: CPT

## 2017-08-14 PROCEDURE — 85025 COMPLETE CBC W/AUTO DIFF WBC: CPT

## 2017-08-14 NOTE — MR AVS SNAPSHOT
Visit Information Date & Time Provider Department Dept. Phone Encounter #  
 8/14/2017 10:50 AM Mayuri Eng MD [address] [facility] [phone] [id number] Upcoming Health Maintenance Date Due DTaP/Tdap/Td series (1 - Tdap) 12/2/2005 EYE EXAM RETINAL OR DILATED Q1 3/19/2015 FOOT EXAM Q1 3/2/2017 MICROALBUMIN Q1 3/2/2017 INFLUENZA AGE 9 TO ADULT 8/1/2017 HEMOGLOBIN A1C Q6M 8/16/2017 LIPID PANEL Q1 2/16/2018 Allergies as of 8/14/2017  Review Complete On: 8/14/2017 By: Mayuri Eng MD  
 No Known Allergies Current Immunizations  Reviewed on 12/9/2015 Name Date PPD 3/27/2012, 3/30/2011 TB Skin Test (PPD) Intradermal 3/17/2015, 3/21/2014, 4/9/2013, 4/5/2013 Not reviewed this visit You Were Diagnosed With   
  
 Codes Comments Controlled type 2 diabetes mellitus without complication, without long-term current use of insulin [facility]    -  Primary ICD-10-CM: E11.9 ICD-9-CM: 250.00   
 MR (mental retardation)     ICD-10-CM: F79 
ICD-9-CM: 714 Essential hypertension     ICD-10-CM: I10 
ICD-9-CM: 401.9 Vitals BP Pulse Temp Resp Height(growth percentile) Weight(growth percentile) 115/82 (BP 1 Location: Left arm, BP Patient Position: Sitting) 70 98.6 °F (37 °C) (Oral) 20 5' 4\" (1.626 m) 181 lb 3.2 oz (82.2 kg) SpO2 BMI Smoking Status 95% 31.1 kg/m2 Never Smoker Vitals History BMI and BSA Data Body Mass Index Body Surface Area  
 31.1 kg/m 2 1.93 m 2 Preferred Pharmacy Pharmacy Name Phone [facility], [address] [phone] Your Updated Medication List  
  
   
This list is accurate as of: 8/14/17 11:34 AM.  Always use your most recent med list.  
  
  
  
  
 albuterol 2.5 mg /3 mL (0.083 %) nebulizer solution Commonly known as:  PROVENTIL VENTOLIN  
 3 mL by Nebulization route every four (4) hours as needed for Wheezing. atenolol 25 mg tablet Commonly known as:  TENORMIN  
TAKE 1 TABLET BY MOUTH DAILY  
  
 bisacodyl 5 mg EC tablet Commonly known as:  DULCOLAX (BISACODYL) Take 1 Tab by mouth daily as needed for Constipation. divalproex  mg tablet Commonly known as:  DEPAKOTE  
  
 DOC-Q-LACE 100 mg capsule Generic drug:  docusate sodium TAKE 1 CAPSULE TWICE DAILY AS NEEDED FOR CONGESTION  
  
 fluticasone 50 mcg/actuation nasal spray Commonly known as:  Maddy Conrad INHALE 2 SPRAYS INTO EACH NOSTRIL ONCE DAILY  
  
 guaiFENesin-dextromethorphan 100-10 mg/5 mL syrup Commonly known as:  ROBITUSSIN-DM Take 10 mL by mouth four (4) times daily as needed for Cough. hydrocortisone 1 % ointment Commonly known as:  HYCORT Apply  to affected area two (2) times a day. use thin layer  
  
 ibuprofen 600 mg tablet Commonly known as:  MOTRIN Take  by mouth every six (6) hours as needed. inhalational spacing device Commonly known as:  SPACE CHAMBER PLUS  
1 Each by Does Not Apply route as needed. For use with albuterol inhaler. lithium carbonate 300 mg capsule Take 300 mg by mouth two (2) times daily (with meals). loratadine 10 mg tablet Commonly known as:  CLARITIN  
TAKE 1 TABLET BY MOUTH DAILY FOR ALLERGIES  
  
 magnesium hydroxide 400 mg/5 mL suspension Commonly known as:  MILK OF MAGNESIA Take 30 mL by mouth daily as needed. MYLANTA MAXIMUM STRENGTH 400-400-40 mg/5 mL suspension Generic drug:  aluminum & magnesium hydroxide-simethicone Take 10 mL by mouth every six (6) hours as needed. Nebulizer & Compressor machine For the administration of albuterol every 4 hours as needed for wheezing. Diagnosis code J45.31.  
  
 ondansetron hcl 4 mg tablet Commonly known as:  Bianca Cedenoer Take 4 mg by mouth every six (6) hours. pramoxine-mineral oil-zinc 1-46.6-12.5 % rectal ointment Commonly known as:  TUCKS  
by PeriANAL route every four (4) hours as needed for Hemorrhoids, Pain and Itching. risperiDONE 4 mg tablet Commonly known as:  RisperDAL Take 2 mg by mouth daily. Senna 8.6 mg Cap Generic drug:  sennosides Take 1 tablet by mouth nightly as needed. temazepam 15 mg capsule Commonly known as:  RESTORIL Take  by mouth nightly as needed. TYLENOL 325 mg tablet Generic drug:  acetaminophen Take  by mouth every four (4) hours as needed. We Performed the Following CBC WITH AUTOMATED DIFF [91648 CPT(R)] HEMOGLOBIN A1C WITH EAG [10126 CPT(R)] METABOLIC PANEL, COMPREHENSIVE [33376 CPT(R)] MICROALBUMIN, UR, RAND W/ MICROALBUMIN/CREA RATIO [id number] CPT(R)] Introducing [facility]! Fuad Hernandez introduces [employer] patient portal. Now you can access parts of your medical record, email your doctor's office, and request medication refills online. 1. In your internet browser, go to [email or web address] 2. Click on the First Time User? Click Here link in the Sign In box. You will see the New Member Sign Up page. 3. Enter your Goozzy Access Code exactly as it appears below. You will not need to use this code after youve completed the sign-up process. If you do not sign up before the expiration date, you must request a new code. · Goozzy Access Code: [facility] Expires: 11/12/2017 11:34 AM 
 
4. Enter the last four digits of your Social Security Number (xxxx) and Date of Birth (mm/dd/yyyy) as indicated and click Submit. You will be taken to the next sign-up page. 5. Create a Resource [employer] ID. This will be your G[employer]zzy login ID and cannot be changed, so think of one that is secure and easy to remember. 6. Create a G[employer]zzy password. You can change your password at any time. 7. Enter your Password Reset Question and Answer. This can be used at a later time if you forget your password. 8. Enter your e-mail address. You will receive e-mail notification when new information is available in [address] 9. Click Sign Up. You can now view and download portions of your medical record. 10. Click the Download Summary menu link to download a portable copy of your medical information. If you have questions, please visit the Frequently Asked Questions section of the [employer] website. Remember, [employer] is NOT to be used for urgent needs. For medical emergencies, dial 911. Now available from your iPhone and Android! Please provide this summary of care documentation to your next provider. Your primary care clinician is listed as Tori Canela. If you have any questions after today's visit, please call [phone].

## 2017-08-14 NOTE — PROGRESS NOTES
Chief Complaint   Patient presents with    Routine       Patient seen in the office today with group home counselor for routine check up.   No specified concerns verbalized at this time

## 2017-08-14 NOTE — LETTER
8/15/2017 10:03 AM 
 
Mr. Trudy Franz [place] [phone] Dear Trudy Franz: 
 
Please find your most recent results below. Resulted Orders CBC WITH AUTOMATED DIFF Result Value Ref Range WBC 6.2 3.4 - 10.8 x10E3/uL  
 RBC 5.15 4.14 - 5.80 x10E6/uL HGB 15.4 12.6 - 17.7 g/dL HCT 46.6 37.5 - 51.0 % MCV 91 79 - 97 fL  
 MCH 29.9 26.6 - 33.0 pg  
 MCHC 33.0 31.5 - 35.7 g/dL  
 RDW 13.8 12.3 - 15.4 % PLATELET 383 183 - 326 x10E3/uL NEUTROPHILS 53 % Lymphocytes 39 % MONOCYTES 6 % EOSINOPHILS 1 % BASOPHILS 0 %  
 ABS. NEUTROPHILS 3.3 1.4 - 7.0 x10E3/uL Abs Lymphocytes 2.4 0.7 - 3.1 x10E3/uL  
 ABS. MONOCYTES 0.4 0.1 - 0.9 x10E3/uL  
 ABS. EOSINOPHILS 0.1 0.0 - 0.4 x10E3/uL  
 ABS. BASOPHILS 0.0 0.0 - 0.2 x10E3/uL IMMATURE GRANULOCYTES 1 %  
 ABS. IMM. GRANS. 0.0 0.0 - 0.1 x10E3/uL Narrative Performed at:  [address]  [id number] : Eder Regalado MD, Phone:  [phone] METABOLIC PANEL, COMPREHENSIVE Result Value Ref Range Glucose 112 (H) 65 - 99 mg/dL BUN 12 6 - 20 mg/dL Creatinine 0.79 0.76 - 1.27 mg/dL GFR est non- >59 mL/min/1.73 GFR est  >59 mL/min/1.73  
 BUN/Creatinine ratio 15 9 - 20 Sodium 143 134 - 144 mmol/L Potassium 4.6 3.5 - 5.2 mmol/L Chloride 105 96 - 106 mmol/L  
 CO2 23 18 - 29 mmol/L Calcium 9.6 8.7 - 10.2 mg/dL Protein, total 6.8 6.0 - 8.5 g/dL Albumin 4.3 3.5 - 5.5 g/dL GLOBULIN, TOTAL 2.5 1.5 - 4.5 g/dL A-G Ratio 1.7 1.2 - 2.2 Bilirubin, total 0.4 0.0 - 1.2 mg/dL Alk. phosphatase 99 39 - 117 IU/L  
 AST (SGOT) 14 0 - 40 IU/L  
 ALT (SGPT) 13 0 - 44 IU/L Narrative Performed at:  [address]  [id number] : Eder Regalado MD, Phone:  [phone] HEMOGLOBIN A1C WITH EAG Result Value Ref Range Hemoglobin A1c 6.0 (H) 4.8 - 5.6 % Comment:  
            Pre-diabetes: 5.7 - 6.4 Diabetes: >6.4 Glycemic control for adults with diabetes: <7.0 Estimated average glucose 126 mg/dL Narrative Performed at:  [address]  [id number] : Trinity Anders MD, Phone:  [phone] MICROALBUMIN, UR, RAND W/ MICROALBUMIN/CREA RATIO Result Value Ref Range Creatinine, urine CANCELED mg/dL Comment: No urine specimen received. Result canceled by the ancillary Microalbumin, urine CANCELED Comment:  
   Test not performed Result canceled by the ancillary Narrative Performed at:  [address]  [id number] : Trinity Anders MD, Phone:  [phone] RECOMMENDATIONS: 
Increase in risk for diabetes, please closely monitor diet and increase exercise All other labs are within normal limits. Please call me if you have any questions: [phone] Sincerely, Buck Lane MD

## 2017-08-14 NOTE — PROGRESS NOTES
Chief Complaint   Patient presents with    Routine     Patient seen in the office today with group home counselor for routine check up. No specified concerns verbalized at this time    Subjective: (As above and below)     Chief Complaint   Patient presents with    Routine     he is a 27y.o. year old male who presents for evaluation. Reviewed PmHx, RxHx, FmHx, SocHx, AllgHx and updated in chart. Review of Systems - negative except as listed above    Objective:     Vitals:    08/14/17 1101   BP: 115/82   Pulse: 70   Resp: 20   Temp: 98.6 °F (37 °C)   TempSrc: Oral   SpO2: 95%   Weight: 181 lb 3.2 oz (82.2 kg)   Height: 5' 4\" (1.626 m)     Physical Examination: General appearance - alert, well appearing, and in no distress  Mental status - baseline for pt, able to answer yes and no  Eyes - pupils equal and reactive, extraocular eye movements intact  Mouth - mucous membranes moist, pharynx normal without lesions  Chest - clear to auscultation, no wheezes, rales or rhonchi, symmetric air entry  Heart - normal rate, regular rhythm, normal S1, S2, no murmurs, rubs, clicks or gallops  Musculoskeletal - no joint tenderness, deformity or swelling  Extremities - peripheral pulses normal, no pedal edema, no clubbing or cyanosis    Assessment/ Plan:   1. Controlled type 2 diabetes mellitus without complication, without long-term current use of insulin (HCC)  -check nonfasting labs  - CBC WITH AUTOMATED DIFF  - METABOLIC PANEL, COMPREHENSIVE  - HEMOGLOBIN A1C WITH EAG  - MICROALBUMIN, UR, RAND W/ MICROALBUMIN/CREA RATIO    2. MR (mental retardation)  -baseline for pt    3. Essential hypertension  -well controlled       I have discussed the diagnosis with the patient and the intended plan as seen in the above orders. The patient has received an after-visit summary and questions were answered concerning future plans.      Medication Side Effects and Warnings were discussed with patient: yes  Patient Labs were reviewed: yes  Patient Past Records were reviewed:  yes    Shane Diallo M.D.

## 2017-08-15 LAB
ALBUMIN SERPL-MCNC: 4.3 G/DL (ref 3.5–5.5)
ALBUMIN/GLOB SERPL: 1.7 {RATIO} (ref 1.2–2.2)
ALP SERPL-CCNC: 99 IU/L (ref 39–117)
ALT SERPL-CCNC: 13 IU/L (ref 0–44)
AST SERPL-CCNC: 14 IU/L (ref 0–40)
BASOPHILS # BLD AUTO: 0 X10E3/UL (ref 0–0.2)
BASOPHILS NFR BLD AUTO: 0 %
BILIRUB SERPL-MCNC: 0.4 MG/DL (ref 0–1.2)
BUN SERPL-MCNC: 12 MG/DL (ref 6–20)
BUN/CREAT SERPL: 15 (ref 9–20)
CALCIUM SERPL-MCNC: 9.6 MG/DL (ref 8.7–10.2)
CHLORIDE SERPL-SCNC: 105 MMOL/L (ref 96–106)
CO2 SERPL-SCNC: 23 MMOL/L (ref 18–29)
CREAT SERPL-MCNC: 0.79 MG/DL (ref 0.76–1.27)
CREAT UR-MCNC: NORMAL MG/DL
EOSINOPHIL # BLD AUTO: 0.1 X10E3/UL (ref 0–0.4)
EOSINOPHIL NFR BLD AUTO: 1 %
ERYTHROCYTE [DISTWIDTH] IN BLOOD BY AUTOMATED COUNT: 13.8 % (ref 12.3–15.4)
EST. AVERAGE GLUCOSE BLD GHB EST-MCNC: 126 MG/DL
GLOBULIN SER CALC-MCNC: 2.5 G/DL (ref 1.5–4.5)
GLUCOSE SERPL-MCNC: 112 MG/DL (ref 65–99)
HBA1C MFR BLD: 6 % (ref 4.8–5.6)
HCT VFR BLD AUTO: 46.6 % (ref 37.5–51)
HGB BLD-MCNC: 15.4 G/DL (ref 12.6–17.7)
IMM GRANULOCYTES # BLD: 0 X10E3/UL (ref 0–0.1)
IMM GRANULOCYTES NFR BLD: 1 %
LYMPHOCYTES # BLD AUTO: 2.4 X10E3/UL (ref 0.7–3.1)
LYMPHOCYTES NFR BLD AUTO: 39 %
MCH RBC QN AUTO: 29.9 PG (ref 26.6–33)
MCHC RBC AUTO-ENTMCNC: 33 G/DL (ref 31.5–35.7)
MCV RBC AUTO: 91 FL (ref 79–97)
MICROALBUMIN UR-MCNC: NORMAL
MONOCYTES # BLD AUTO: 0.4 X10E3/UL (ref 0.1–0.9)
MONOCYTES NFR BLD AUTO: 6 %
NEUTROPHILS # BLD AUTO: 3.3 X10E3/UL (ref 1.4–7)
NEUTROPHILS NFR BLD AUTO: 53 %
PLATELET # BLD AUTO: 159 X10E3/UL (ref 150–379)
POTASSIUM SERPL-SCNC: 4.6 MMOL/L (ref 3.5–5.2)
PROT SERPL-MCNC: 6.8 G/DL (ref 6–8.5)
RBC # BLD AUTO: 5.15 X10E6/UL (ref 4.14–5.8)
SODIUM SERPL-SCNC: 143 MMOL/L (ref 134–144)
WBC # BLD AUTO: 6.2 X10E3/UL (ref 3.4–10.8)

## 2017-08-15 NOTE — PROGRESS NOTES
Notified Willodean Nyhan at group home of results. Copy of results faxed to [phone] as requested.

## 2017-11-21 ENCOUNTER — DOCUMENTATION ONLY (OUTPATIENT)
Dept: FAMILY MEDICINE CLINIC | Age: 32
End: 2017-11-21

## 2017-11-24 ENCOUNTER — DOCUMENTATION ONLY (OUTPATIENT)
Dept: FAMILY MEDICINE CLINIC | Age: 32
End: 2017-11-24

## 2017-12-06 ENCOUNTER — OFFICE VISIT (OUTPATIENT)
Dept: FAMILY MEDICINE CLINIC | Age: 32
End: 2017-12-06

## 2017-12-06 ENCOUNTER — HOSPITAL ENCOUNTER (OUTPATIENT)
Dept: LAB | Age: 32
Discharge: HOME | End: 2017-12-06
Payer: MEDICARE

## 2017-12-06 VITALS
SYSTOLIC BLOOD PRESSURE: 110 MMHG | TEMPERATURE: 98.6 F | HEART RATE: 80 BPM | WEIGHT: 188 LBS | BODY MASS INDEX: 32.1 KG/M2 | HEIGHT: 64 IN | OXYGEN SATURATION: 98 % | RESPIRATION RATE: 18 BRPM | DIASTOLIC BLOOD PRESSURE: 81 MMHG

## 2017-12-06 DIAGNOSIS — F79 MR (MENTAL RETARDATION): Chronic | ICD-10-CM

## 2017-12-06 DIAGNOSIS — I10 ESSENTIAL HYPERTENSION: ICD-10-CM

## 2017-12-06 DIAGNOSIS — E11.9 CONTROLLED TYPE 2 DIABETES MELLITUS WITHOUT COMPLICATION, WITHOUT LONG-TERM CURRENT USE OF INSULIN (HCC): Primary | Chronic | ICD-10-CM

## 2017-12-06 PROCEDURE — 83036 HEMOGLOBIN GLYCOSYLATED A1C: CPT

## 2017-12-06 PROCEDURE — 80053 COMPREHEN METABOLIC PANEL: CPT

## 2017-12-06 PROCEDURE — 80061 LIPID PANEL: CPT

## 2017-12-06 NOTE — PROGRESS NOTES
1. Have you been to the ER, urgent care clinic since your last visit? Hospitalized since your last visit? No    2. Have you seen or consulted any other health care providers outside of the [address] [place] [address] since your last visit? Include any pap smears or colon screening. No     Chief Complaint   Patient presents with    Follow-up     Pt presents to the office for f/u      Lab Results   Component Value Date/Time    Microalb/Creat ratio (ug/mg creat.) 3.9 03/18/2015 10:21 AM      Chief Complaint   Patient presents with    Follow-up     he is a 34y.o. year old male who presents for evaluation. See Diabetic Report Card listed above. Patient Active Problem List    Diagnosis    Intermittent explosive disorder    HTN (hypertension)    MR (mental retardation)    Allergic rhinitis due to other allergen    Unspecified asthma(493.90)    Diabetes mellitus type 2, controlled ([facility]       Reviewed PmHx, RxHx, FmHx, SocHx, AllgHx--dated and updated in the chart. Review of Systems - negative except as listed above in the HPI    Objective:     Vitals:    12/06/17 1017   BP: 110/81   Pulse: 80   Resp: 18   Temp: 98.6 °F (37 °C)   TempSrc: Oral   SpO2: 98%   Weight: 188 lb (85.3 kg)   Height: 5' 4\" (1.626 m)     Physical Examination: General appearance - alert, well appearing, and in no distress  Neck - supple, no significant adenopathy  Chest - clear to auscultation, no wheezes, rales or rhonchi, symmetric air entry  Heart - normal rate, regular rhythm, normal S1, S2, no murmurs, rubs, clicks or gallops    Foot Exam:  Feet were examined, no sensory or circulatory issues, no ulcers noted  Assessment/ Plan:   Diagnoses and all orders for this visit:    1. Controlled type 2 diabetes mellitus without complication, without long-term current use of insulin ([facility])  -     LIPID PANEL  -     METABOLIC PANEL, COMPREHENSIVE  -     HEMOGLOBIN A1C WITH EAG  -     REFERRAL TO OPHTHALMOLOGY  -at goal    2.  Essential hypertension  -     LIPID PANEL  -     METABOLIC PANEL, COMPREHENSIVE    3. MR (mental retardation)  -stable at [facility]     Follow-up Disposition:  Return in about 3 months (around 3/6/2018) for dm. Lab Results   Component Value Date/Time    Cholesterol, total 136 02/16/2017 10:05 AM    HDL Cholesterol 29 02/16/2017 10:05 AM    LDL, calculated 84 02/16/2017 10:05 AM    Triglyceride 113 02/16/2017 10:05 AM     Lab Results   Component Value Date/Time    Hemoglobin A1c 6.0 08/14/2017 11:40 AM    Hemoglobin A1c 6.0 02/16/2017 10:05 AM    Hemoglobin A1c 5.9 08/16/2016 09:28 AM    Microalb/Creat ratio (ug/mg creat.) 3.9 03/18/2015 10:21 AM    LDL, calculated 84 02/16/2017 10:05 AM    Creatinine 0.79 08/14/2017 11:40 AM          Discussed with patient goal of Diabetes to include:  HgA1C <7, LDL cholesterol <70, Blood pressure <130/80. Discussed with patient diet and weight management and to get regular exercise. Recommend yearly eye exams and daily foot care. The patient understands and agrees with the plan. I have discussed the diagnosis with the patient and the intended plan as seen in the above orders. The patient has received an after-visit summary and questions were answered concerning future plans. Medication Side Effects and Warnings were discussed with patient  Patient Labs were reviewed and or requested  Patient Past Records were reviewed and or requested    Jitendra Haynes M.D. [address] [facility]    There are no Patient Instructions on file for this visit.

## 2017-12-06 NOTE — MR AVS SNAPSHOT
Visit Information Date & Time Provider Department Dept. Phone Encounter #  
 12/6/2017  9:40 AM Madelin Chino MD [address] [facility] [phone] [id number] Follow-up Instructions Return in about 3 months (around 3/6/2018) for dm. Upcoming Health Maintenance Date Due DTaP/Tdap/Td series (1 - Tdap) 12/2/2005 EYE EXAM RETINAL OR DILATED Q1 3/19/2015 HEMOGLOBIN A1C Q6M 2/14/2018 LIPID PANEL Q1 2/16/2018 MICROALBUMIN Q1 8/14/2018 FOOT EXAM Q1 12/6/2018 Allergies as of 12/6/2017  Review Complete On: 12/6/2017 By: Madelin Chino MD  
 No Known Allergies Current Immunizations  Reviewed on 12/9/2015 Name Date PPD 3/27/2012, 3/30/2011 TB Skin Test (PPD) Intradermal 3/17/2015, 3/21/2014, 4/9/2013, 4/5/2013 Not reviewed this visit You Were Diagnosed With   
  
 Codes Comments Controlled type 2 diabetes mellitus without complication, without long-term current use of insulin ([facility]    -  Primary ICD-10-CM: E11.9 ICD-9-CM: 250.00 Essential hypertension     ICD-10-CM: I10 
ICD-9-CM: 401.9 MR (mental retardation)     ICD-10-CM: D43 
ICD-9-CM: 584 Vitals BP Pulse Temp Resp Height(growth percentile) Weight(growth percentile) 110/81 80 98.6 °F (37 °C) (Oral) 18 5' 4\" (1.626 m) 188 lb (85.3 kg) SpO2 BMI Smoking Status 98% 32.27 kg/m2 Never Smoker BMI and BSA Data Body Mass Index Body Surface Area  
 32.27 kg/m 2 1.96 m 2 Preferred Pharmacy Pharmacy Name Phone [facility], [address] [phone] Your Updated Medication List  
  
   
This list is accurate as of: 12/6/17 10:55 AM.  Always use your most recent med list.  
  
  
  
  
 albuterol 2.5 mg /3 mL (0.083 %) nebulizer solution Commonly known as:  PROVENTIL VENTOLIN  
3 mL by Nebulization route every four (4) hours as needed for Wheezing. atenolol 25 mg tablet Commonly known as:  TENORMIN  
TAKE 1 TABLET BY MOUTH DAILY  
  
 bisacodyl 5 mg EC tablet Commonly known as:  DULCOLAX (BISACODYL) Take 1 Tab by mouth daily as needed for Constipation. divalproex  mg tablet Commonly known as:  DEPAKOTE  
  
 DOC-Q-LACE 100 mg capsule Generic drug:  docusate sodium TAKE 1 CAPSULE TWICE DAILY AS NEEDED FOR CONGESTION  
  
 fluticasone 50 mcg/actuation nasal spray Commonly known as:  Caffie Euler INHALE 2 SPRAYS INTO EACH NOSTRIL ONCE DAILY  
  
 guaiFENesin-dextromethorphan 100-10 mg/5 mL syrup Commonly known as:  ROBITUSSIN-DM Take 10 mL by mouth four (4) times daily as needed for Cough. hydrocortisone 1 % ointment Commonly known as:  HYCORT Apply  to affected area two (2) times a day. use thin layer  
  
 ibuprofen 600 mg tablet Commonly known as:  MOTRIN Take  by mouth every six (6) hours as needed. inhalational spacing device Commonly known as:  SPACE CHAMBER PLUS  
1 Each by Does Not Apply route as needed. For use with albuterol inhaler. lithium carbonate 300 mg capsule Take 300 mg by mouth two (2) times daily (with meals). loratadine 10 mg tablet Commonly known as:  CLARITIN  
TAKE 1 TABLET BY MOUTH DAILY FOR ALLERGIES  
  
 magnesium hydroxide 400 mg/5 mL suspension Commonly known as:  MILK OF MAGNESIA Take 30 mL by mouth daily as needed. MYLANTA MAXIMUM STRENGTH 400-400-40 mg/5 mL suspension Generic drug:  aluminum & magnesium hydroxide-simethicone Take 10 mL by mouth every six (6) hours as needed. Nebulizer & Compressor machine For the administration of albuterol every 4 hours as needed for wheezing. Diagnosis code J45.31.  
  
 ondansetron hcl 4 mg tablet Commonly known as:  Glorianne Ou Take 4 mg by mouth every six (6) hours. pramoxine-mineral oil-zinc 1-46.6-12.5 % rectal ointment Commonly known as:  TUCKS  
by PeriANAL route every four (4) hours as needed for Hemorrhoids, Pain and Itching. risperiDONE 4 mg tablet Commonly known as:  RisperDAL Take 2 mg by mouth daily. Senna 8.6 mg Cap Generic drug:  sennosides Take 1 tablet by mouth nightly as needed. temazepam 15 mg capsule Commonly known as:  RESTORIL Take  by mouth nightly as needed. TYLENOL 325 mg tablet Generic drug:  acetaminophen Take  by mouth every four (4) hours as needed. We Performed the Following HEMOGLOBIN A1C WITH EAG [60908 CPT(R)] LIPID PANEL [40356 CPT(R)] METABOLIC PANEL, COMPREHENSIVE [61675 CPT(R)] REFERRAL TO OPHTHALMOLOGY [REF57 Custom] Follow-up Instructions Return in about 3 months (around 3/6/2018) for dm. Referral Information Referral ID Referred By Referred To  
  
 [id number] LULU HARPER Not Available Visits Status Start Date End Date 1 New Request 12/6/17 12/6/18 If your referral has a status of pending review or denied, additional information will be sent to support the outcome of this decision. Introducing [facility] & HEALTH SERVICES! [employer] introduces SavySwap patient portal. Now you can access parts of your medical record, email your doctor's office, and request medication refills online. 1. In your internet browser, go to [email or web address] 2. Click on the First Time User? Click Here link in the Sign In box. You will see the New Member Sign Up page. 3. Enter your SavySwap Access Code exactly as it appears below. You will not need to use this code after youve completed the sign-up process. If you do not sign up before the expiration date, you must request a new code. · SavySwap Access Code: [email or web address] Expires: 3/6/2018 10:54 AM 
 
4. Enter the last four digits of your Social Security Number (xxxx) and Date of Birth (mm/dd/yyyy) as indicated and click Submit. You will be taken to the next sign-up page. 5. Create a Shoozy ID. This will be your Shoozy login ID and cannot be changed, so think of one that is secure and easy to remember. 6. Create a Shoozy password. You can change your password at any time. 7. Enter your Password Reset Question and Answer. This can be used at a later time if you forget your password. 8. Enter your e-mail address. You will receive e-mail notification when new information is available in [address] 9. Click Sign Up. You can now view and download portions of your medical record. 10. Click the Download Summary menu link to download a portable copy of your medical information. If you have questions, please visit the Frequently Asked Questions section of the Shoozy website. Remember, Shoozy is NOT to be used for urgent needs. For medical emergencies, dial 911. Now available from your iPhone and Android! Please provide this summary of care documentation to your next provider. Your primary care clinician is listed as Tori Canela. If you have any questions after today's visit, please call [phone].

## 2017-12-06 NOTE — LETTER
12/7/2017 4:48 PM 
 
Mr. Laina Eddy [place] [phone] Dear Laina Eddy: 
 
Please find your most recent results below. Resulted Orders LIPID PANEL Result Value Ref Range Cholesterol, total 126 100 - 199 mg/dL Triglyceride 151 (H) 0 - 149 mg/dL HDL Cholesterol 26 (L) >39 mg/dL VLDL, calculated 30 5 - 40 mg/dL LDL, calculated 70 0 - 99 mg/dL Narrative Performed at:  [address]  [id number] : Jeffery Herrera MD, Phone:  [phone] METABOLIC PANEL, COMPREHENSIVE Result Value Ref Range Glucose 112 (H) 65 - 99 mg/dL BUN 10 6 - 20 mg/dL Creatinine 0.65 (L) 0.76 - 1.27 mg/dL GFR est non- >59 mL/min/1.73 GFR est  >59 mL/min/1.73  
 BUN/Creatinine ratio 15 9 - 20 Sodium 143 134 - 144 mmol/L Potassium 4.2 3.5 - 5.2 mmol/L Chloride 106 96 - 106 mmol/L  
 CO2 21 18 - 29 mmol/L Calcium 8.9 8.7 - 10.2 mg/dL Protein, total 6.7 6.0 - 8.5 g/dL Albumin 4.0 3.5 - 5.5 g/dL GLOBULIN, TOTAL 2.7 1.5 - 4.5 g/dL A-G Ratio 1.5 1.2 - 2.2 Bilirubin, total 0.5 0.0 - 1.2 mg/dL Alk. phosphatase 81 39 - 117 IU/L  
 AST (SGOT) 13 0 - 40 IU/L  
 ALT (SGPT) 13 0 - 44 IU/L Narrative Performed at:  [address]  [id number] : Jeffery Herrera MD, Phone:  [phone] HEMOGLOBIN A1C WITH EAG Result Value Ref Range Hemoglobin A1c 5.7 (H) 4.8 - 5.6 % Comment:  
            Pre-diabetes: 5.7 - 6.4 Diabetes: >6.4 Glycemic control for adults with diabetes: <7.0 Estimated average glucose 117 mg/dL Narrative Performed at:  [facility] [address] [place] [address]  [id number] : Jeffery Herrera MD, Phone:  [phone] CVD REPORT Result Value Ref Range INTERPRETATION Note Comment: Supplemental report is available. Narrative Performed at:  [address] [address] [place], [place]  [id number] : Luke Roper PhD, Phone:  [phone] DIABETES PATIENT EDUCATION Result Value Ref Range PDF Image Not applicable Narrative Performed at:  [address] [address] [place], [place]  [id number] : Luke Roper PhD, Phone:  [phone] RECOMMENDATIONS: 
   
    
  After reviewing your labs, I believe they are within normal  
limits for your age and let us stay with our current plan of action. In addition, you may receive a [employer] Ganey Survey from our office.    
Thank you in advance for filling this out for us, and if you cannot  
give a 10 on our ratings, please reach out to us and let us know  
what we can do better for you!  We strive for a ten, and while we  
may not be perfect 100% of the time, we always try our best for  
our patients! Bam Melton M.D. Good Help to Those in Need Please call me if you have any questions: [phone] Sincerely, Pramod Kulkarni MD

## 2017-12-07 LAB
ALBUMIN SERPL-MCNC: 4 G/DL (ref 3.5–5.5)
ALBUMIN/GLOB SERPL: 1.5 {RATIO} (ref 1.2–2.2)
ALP SERPL-CCNC: 81 IU/L (ref 39–117)
ALT SERPL-CCNC: 13 IU/L (ref 0–44)
AST SERPL-CCNC: 13 IU/L (ref 0–40)
BILIRUB SERPL-MCNC: 0.5 MG/DL (ref 0–1.2)
BUN SERPL-MCNC: 10 MG/DL (ref 6–20)
BUN/CREAT SERPL: 15 (ref 9–20)
CALCIUM SERPL-MCNC: 8.9 MG/DL (ref 8.7–10.2)
CHLORIDE SERPL-SCNC: 106 MMOL/L (ref 96–106)
CHOLEST SERPL-MCNC: 126 MG/DL (ref 100–199)
CO2 SERPL-SCNC: 21 MMOL/L (ref 18–29)
CREAT SERPL-MCNC: 0.65 MG/DL (ref 0.76–1.27)
EST. AVERAGE GLUCOSE BLD GHB EST-MCNC: 117 MG/DL
GFR SERPLBLD CREATININE-BSD FMLA CKD-EPI: 128 ML/MIN/1.73
GFR SERPLBLD CREATININE-BSD FMLA CKD-EPI: 148 ML/MIN/1.73
GLOBULIN SER CALC-MCNC: 2.7 G/DL (ref 1.5–4.5)
GLUCOSE SERPL-MCNC: 112 MG/DL (ref 65–99)
HBA1C MFR BLD: 5.7 % (ref 4.8–5.6)
HDLC SERPL-MCNC: 26 MG/DL
INTERPRETATION, 910389: NORMAL
LDLC SERPL CALC-MCNC: 70 MG/DL (ref 0–99)
Lab: NORMAL
POTASSIUM SERPL-SCNC: 4.2 MMOL/L (ref 3.5–5.2)
PROT SERPL-MCNC: 6.7 G/DL (ref 6–8.5)
SODIUM SERPL-SCNC: 143 MMOL/L (ref 134–144)
TRIGL SERPL-MCNC: 151 MG/DL (ref 0–149)
VLDLC SERPL CALC-MCNC: 30 MG/DL (ref 5–40)

## 2017-12-07 NOTE — PROGRESS NOTES
After reviewing your labs, I believe they are within normal  limits for your age and let us stay with our current plan of action. In addition, you may receive a [employer] from our office. Thank you in advance for filling this out for us, and if you cannot   give a 10 on our ratings, please reach out to us and let us know  what we can do better for you! We strive for a ten, and while we   may not be perfect 100% of the time, we always try our best for  our patients! Vasu Boyd M.D.   Good Help to Those in Need

## 2017-12-07 NOTE — PROGRESS NOTES
A letter was sent to the patient at the address on file, with lab results and Dr. Reddy Raw recommendations for the patient.

## 2018-01-30 RX ORDER — ATENOLOL 25 MG/1
TABLET ORAL
Qty: 31 TAB | Status: SHIPPED | OUTPATIENT
Start: 2018-01-30 | End: 2019-01-30 | Stop reason: SDUPTHER

## 2018-03-05 RX ORDER — LORATADINE 10 MG/1
TABLET ORAL
Qty: 28 TAB | Status: SHIPPED | OUTPATIENT
Start: 2018-03-05 | End: 2021-12-28

## 2018-03-06 ENCOUNTER — OFFICE VISIT (OUTPATIENT)
Dept: FAMILY MEDICINE CLINIC | Age: 33
End: 2018-03-06

## 2018-03-06 ENCOUNTER — HOSPITAL ENCOUNTER (OUTPATIENT)
Dept: LAB | Age: 33
Discharge: HOME | End: 2018-03-06
Payer: MEDICARE

## 2018-03-06 VITALS
HEIGHT: 64 IN | RESPIRATION RATE: 20 BRPM | DIASTOLIC BLOOD PRESSURE: 80 MMHG | HEART RATE: 69 BPM | SYSTOLIC BLOOD PRESSURE: 117 MMHG | BODY MASS INDEX: 31.58 KG/M2 | OXYGEN SATURATION: 93 % | WEIGHT: 185 LBS | TEMPERATURE: 98.1 F

## 2018-03-06 DIAGNOSIS — I10 ESSENTIAL HYPERTENSION: ICD-10-CM

## 2018-03-06 DIAGNOSIS — E11.9 CONTROLLED TYPE 2 DIABETES MELLITUS WITHOUT COMPLICATION, WITHOUT LONG-TERM CURRENT USE OF INSULIN (HCC): Primary | Chronic | ICD-10-CM

## 2018-03-06 DIAGNOSIS — F79 MR (MENTAL RETARDATION): Chronic | ICD-10-CM

## 2018-03-06 PROCEDURE — 80061 LIPID PANEL: CPT

## 2018-03-06 PROCEDURE — 83036 HEMOGLOBIN GLYCOSYLATED A1C: CPT

## 2018-03-06 PROCEDURE — 80053 COMPREHEN METABOLIC PANEL: CPT

## 2018-03-06 NOTE — MR AVS SNAPSHOT
[address] [place] [address] 
[phone] Patient: Jillian Guevara MRN: [id number] Visit Information Date & Time Provider Department Dept. Phone Encounter #  
 3/6/2018  1:30 PM Naomie Finch MD [address] [facility] [phone] [id number] Follow-up Instructions Return in about 3 months (around 2018) for dm. Upcoming Health Maintenance Date Due DTaP/Tdap/Td series (1 - Tdap) 2005 EYE EXAM RETINAL OR DILATED Q1 3/19/2015 HEMOGLOBIN A1C Q6M 2018 MICROALBUMIN Q1 2018 FOOT EXAM Q1 2018 LIPID PANEL Q1 2018 Allergies as of 3/6/2018  Review Complete On: 3/6/2018 By: Naomie Finch MD  
 No Known Allergies Current Immunizations  Reviewed on 2015 Name Date PPD 3/27/2012, 3/30/2011 TB Skin Test (PPD) Intradermal 3/17/2015, 3/21/2014, 2013, 2013 Not reviewed this visit You Were Diagnosed With   
  
 Codes Comments Controlled type 2 diabetes mellitus without complication, without long-term current use of insulin ([facility]    -  Primary ICD-10-CM: E11.9 ICD-9-CM: 250.00 Essential hypertension     ICD-10-CM: I10 
ICD-9-CM: 401.9 MR (mental retardation)     ICD-10-CM: N63 
ICD-9-CM: 462 Lives in group home     ICD-10-CM: Z59.3 ICD-9-CM: V60.6 Vitals BP Pulse Temp Resp Height(growth percentile) Weight(growth percentile) 117/80 69 98.1 °F (36.7 °C) 20 5' 4\" (1.626 m) 185 lb (83.9 kg) SpO2 BMI Smoking Status 93% 31.76 kg/m2 Never Smoker Vitals History BMI and BSA Data Body Mass Index Body Surface Area 31.76 kg/m 2 1.95 m 2 Preferred Pharmacy Pharmacy Name Phone [facility], [address] [phone] Your Updated Medication List  
  
   
This list is accurate as of 3/6/18  2:20 PM.  Always use your most recent med list.  
  
  
  
  
 albuterol 2.5 mg /3 mL (0.083 %) nebulizer solution Commonly known as:  PROVENTIL VENTOLIN  
3 mL by Nebulization route every four (4) hours as needed for Wheezing. atenolol 25 mg tablet Commonly known as:  TENORMIN  
TAKE 1 TABLET BY MOUTH DAILY  
  
 bisacodyl 5 mg EC tablet Commonly known as:  DULCOLAX (BISACODYL) Take 1 Tab by mouth daily as needed for Constipation. divalproex  mg tablet Commonly known as:  DEPAKOTE  
  
 DOC-Q-LACE 100 mg capsule Generic drug:  docusate sodium TAKE 1 CAPSULE TWICE DAILY AS NEEDED FOR CONGESTION  
  
 fluticasone 50 mcg/actuation nasal spray Commonly known as:  Marlaine Romance INHALE 2 SPRAYS INTO EACH NOSTRIL ONCE DAILY  
  
 guaiFENesin-dextromethorphan 100-10 mg/5 mL syrup Commonly known as:  ROBITUSSIN-DM Take 10 mL by mouth four (4) times daily as needed for Cough. hydrocortisone 1 % ointment Commonly known as:  HYCORT Apply  to affected area two (2) times a day. use thin layer  
  
 ibuprofen 600 mg tablet Commonly known as:  MOTRIN Take  by mouth every six (6) hours as needed. inhalational spacing device Commonly known as:  SPACE CHAMBER PLUS  
1 Each by Does Not Apply route as needed. For use with albuterol inhaler. lithium carbonate 300 mg capsule Take 300 mg by mouth two (2) times daily (with meals). loratadine 10 mg tablet Commonly known as:  CLARITIN  
TAKE 1 TABLET BY MOUTH DAILY FOR ALLERGIES  
  
 magnesium hydroxide 400 mg/5 mL suspension Commonly known as:  MILK OF MAGNESIA Take 30 mL by mouth daily as needed. MYLANTA MAXIMUM STRENGTH 400-400-40 mg/5 mL suspension Generic drug:  aluminum & magnesium hydroxide-simethicone Take 10 mL by mouth every six (6) hours as needed. Nebulizer & Compressor machine For the administration of albuterol every 4 hours as needed for wheezing. Diagnosis code J45.31.  
  
 ondansetron hcl 4 mg tablet Commonly known as:  Normie Brisk Take 4 mg by mouth every six (6) hours. pramoxine-mineral oil-zinc 1-46.6-12.5 % rectal ointment Commonly known as:  TUCKS  
by PeriANAL route every four (4) hours as needed for Hemorrhoids, Pain and Itching. risperiDONE 4 mg tablet Commonly known as:  RisperDAL Take 2 mg by mouth daily. Senna 8.6 mg Cap Generic drug:  sennosides Take 1 tablet by mouth nightly as needed. temazepam 15 mg capsule Commonly known as:  RESTORIL Take  by mouth nightly as needed. TYLENOL 325 mg tablet Generic drug:  acetaminophen Take  by mouth every four (4) hours as needed. We Performed the Following HEMOGLOBIN A1C WITH EAG [39893 CPT(R)] LIPID PANEL [62750 CPT(R)] METABOLIC PANEL, COMPREHENSIVE [57538 CPT(R)] Follow-up Instructions Return in about 3 months (around 6/6/2018) for dm. Introducing hospitals & HEALTH SERVICES! [employer] introduces [employer] patient portal. Now you can access parts of your medical record, email your doctor's office, and request medication refills online. 1. In your internet browser, go to [email or web address] 2. Click on the First Time User? Click Here link in the Sign In box. You will see the New Member Sign Up page. 3. Enter your [employer] Access Code exactly as it appears below. You will not need to use this code after youve completed the sign-up process. If you do not sign up before the expiration date, you must request a new code. · [employer] Access Code: [email or web address] Expires: 6/4/2018  2:20 PM 
 
4. Enter the last four digits of your Social Security Number (xxxx) and Date of Birth (mm/dd/yyyy) as indicated and click Submit. You will be taken to the next sign-up page. 5. Create a [employer] ID. This will be your [employer] login ID and cannot be changed, so think of one that is secure and easy to remember. 6. Create a WhoGotStuff password. You can change your password at any time. 7. Enter your Password Reset Question and Answer. This can be used at a later time if you forget your password. 8. Enter your e-mail address. You will receive e-mail notification when new information is available in [address] 9. Click Sign Up. You can now view and download portions of your medical record. 10. Click the Download Summary menu link to download a portable copy of your medical information. If you have questions, please visit the Frequently Asked Questions section of the WhoGot[employer]uff website. Remember, WhoGot[employer]uff is NOT to be used for urgent needs. For medical emergencies, dial 911. Now available from your iPhone and Android! Please provide this summary of care documentation to your next provider. Your primary care clinician is listed as Tori Canela. If you have any questions after today's visit, please call [phone].

## 2018-03-06 NOTE — PROGRESS NOTES
Patient here for 3 month f/u, ears hurt. 1. Have you been to the ER, urgent care clinic since your last visit? Hospitalized since your last visit? No    2. Have you seen or consulted any other health care providers outside of the [address] [place] [address] since your last visit? Include any pap smears or colon screening. No     Lab Results   Component Value Date/Time    Microalb/Creat ratio (ug/mg creat.) 3.9 03/18/2015 10:21 AM      Chief Complaint   Patient presents with    Ear Fullness     ears hurt    Follow-up     3 month f/u     he is a 34y.o. year old male who presents for evaluation. See Diabetic Report Card listed above. Patient Active Problem List    Diagnosis    Lives in group home    Intermittent explosive disorder    HTN (hypertension)    MR (mental retardation)    Allergic rhinitis due to other allergen    Unspecified asthma(493.90)    Diabetes mellitus type 2, controlled ([facility]       Reviewed PmHx, RxHx, FmHx, SocHx, AllgHx--dated and updated in the chart. Review of Systems - negative except as listed above in the HPI    Objective:     Vitals:    03/06/18 1413   BP: 117/80   Pulse: 69   Resp: 20   Temp: 98.1 °F (36.7 °C)   SpO2: 93%   Weight: 185 lb (83.9 kg)   Height: 5' 4\" (1.626 m)     Physical Examination: General appearance - alert, well appearing, and in no distress  Eyes - pupils equal and reactive, extraocular eye movements intact  Neck - supple, no significant adenopathy  Chest - clear to auscultation, no wheezes, rales or rhonchi, symmetric air entry  Heart - normal rate, regular rhythm, normal S1, S2, no murmurs, rubs, clicks or gallops  Abdomen - soft, nontender, nondistended, no masses or organomegaly  Extremities - peripheral pulses normal, no pedal edema, no clubbing or cyanosis    Assessment/ Plan:   Diagnoses and all orders for this visit:    1.  Controlled type 2 diabetes mellitus without complication, without long-term current use of insulin (HCC)  -     LIPID PANEL  -     METABOLIC PANEL, COMPREHENSIVE  -     HEMOGLOBIN A1C WITH EAG  -at goal    2. Essential hypertension  -     LIPID PANEL  -     METABOLIC PANEL, COMPREHENSIVE  -at goal    3. MR (mental retardation)  -at baseline    4. Lives in group home       Follow-up Disposition:  Return in about 3 months (around 6/6/2018) for dm. Lab Results   Component Value Date/Time    Cholesterol, total 126 12/06/2017 10:55 AM    HDL Cholesterol 26 (L) 12/06/2017 10:55 AM    LDL, calculated 70 12/06/2017 10:55 AM    Triglyceride 151 (H) 12/06/2017 10:55 AM     Lab Results   Component Value Date/Time    Hemoglobin A1c 5.7 (H) 12/06/2017 10:55 AM    Hemoglobin A1c 6.0 (H) 08/14/2017 11:40 AM    Hemoglobin A1c 6.0 (H) 02/16/2017 10:05 AM    Microalb/Creat ratio (ug/mg creat.) 3.9 03/18/2015 10:21 AM    LDL, calculated 70 12/06/2017 10:55 AM    Creatinine 0.65 (L) 12/06/2017 10:55 AM          Discussed with patient goal of Diabetes to include:  HgA1C <7, LDL cholesterol <70, Blood pressure <130/80. Discussed with patient diet and weight management and to get regular exercise. Recommend yearly eye exams and daily foot care. The patient understands and agrees with the plan. I have discussed the diagnosis with the patient and the intended plan as seen in the above orders. The patient has received an after-visit summary and questions were answered concerning future plans. Medication Side Effects and Warnings were discussed with patient  Patient Labs were reviewed and or requested  Patient Past Records were reviewed and or requested    Sola Collado M.D. [address] [facility]    There are no Patient Instructions on file for this visit.

## 2018-03-07 LAB
ALBUMIN SERPL-MCNC: 4.3 G/DL (ref 3.5–5.5)
ALBUMIN/GLOB SERPL: 1.9 {RATIO} (ref 1.2–2.2)
ALP SERPL-CCNC: 87 IU/L (ref 39–117)
ALT SERPL-CCNC: 14 IU/L (ref 0–44)
AST SERPL-CCNC: 14 IU/L (ref 0–40)
BILIRUB SERPL-MCNC: 0.3 MG/DL (ref 0–1.2)
BUN SERPL-MCNC: 13 MG/DL (ref 6–20)
BUN/CREAT SERPL: 16 (ref 9–20)
CALCIUM SERPL-MCNC: 9.5 MG/DL (ref 8.7–10.2)
CHLORIDE SERPL-SCNC: 102 MMOL/L (ref 96–106)
CHOLEST SERPL-MCNC: 127 MG/DL (ref 100–199)
CO2 SERPL-SCNC: 24 MMOL/L (ref 18–29)
CREAT SERPL-MCNC: 0.82 MG/DL (ref 0.76–1.27)
EST. AVERAGE GLUCOSE BLD GHB EST-MCNC: 117 MG/DL
GFR SERPLBLD CREATININE-BSD FMLA CKD-EPI: 116 ML/MIN/1.73
GFR SERPLBLD CREATININE-BSD FMLA CKD-EPI: 134 ML/MIN/1.73
GLOBULIN SER CALC-MCNC: 2.3 G/DL (ref 1.5–4.5)
GLUCOSE SERPL-MCNC: 111 MG/DL (ref 65–99)
HBA1C MFR BLD: 5.7 % (ref 4.8–5.6)
HDLC SERPL-MCNC: 27 MG/DL
INTERPRETATION, 910389: NORMAL
LDLC SERPL CALC-MCNC: 64 MG/DL (ref 0–99)
Lab: NORMAL
POTASSIUM SERPL-SCNC: 4.3 MMOL/L (ref 3.5–5.2)
PROT SERPL-MCNC: 6.6 G/DL (ref 6–8.5)
SODIUM SERPL-SCNC: 140 MMOL/L (ref 134–144)
TRIGL SERPL-MCNC: 178 MG/DL (ref 0–149)
VLDLC SERPL CALC-MCNC: 36 MG/DL (ref 5–40)

## 2018-03-07 NOTE — PROGRESS NOTES
After reviewing your labs, I believe they are within normal  limits for your age and let us stay with our current plan of action. In addition, you may receive a [employer] from our office. Thank you in advance for filling this out for us, and if you cannot   give a 10 on our ratings, please reach out to us and let us know  what we can do better for you! We strive for a ten, and while we   may not be perfect 100% of the time, we always try our best for  our patients! Mildred Gorman M.D.   Good Help to Those in Need  s

## 2018-03-23 ENCOUNTER — DOCUMENTATION ONLY (OUTPATIENT)
Dept: FAMILY MEDICINE CLINIC | Age: 33
End: 2018-03-23

## 2018-03-23 NOTE — PROGRESS NOTES
Letter Re: labs returned due to insufficient address.  Letter put on [facility]'s desk

## 2018-04-02 ENCOUNTER — OFFICE VISIT (OUTPATIENT)
Dept: FAMILY MEDICINE CLINIC | Age: 33
End: 2018-04-02

## 2018-04-02 VITALS
BODY MASS INDEX: 31.76 KG/M2 | HEART RATE: 76 BPM | HEIGHT: 64 IN | WEIGHT: 186 LBS | RESPIRATION RATE: 18 BRPM | OXYGEN SATURATION: 96 % | DIASTOLIC BLOOD PRESSURE: 90 MMHG | TEMPERATURE: 98.6 F | SYSTOLIC BLOOD PRESSURE: 119 MMHG

## 2018-04-02 DIAGNOSIS — Z11.1 PPD SCREENING TEST: ICD-10-CM

## 2018-04-02 DIAGNOSIS — Z00.00 ROUTINE GENERAL MEDICAL EXAMINATION AT A HEALTH CARE FACILITY: Primary | ICD-10-CM

## 2018-04-02 NOTE — PROGRESS NOTES
Chief Complaint   Patient presents with    Complete Physical    PPD Reading     Group Home     1. Have you been to the ER, urgent care clinic since your last visit? Hospitalized since your last visit? No    2. Have you seen or consulted any other health care providers outside of the [address] [place] [address] since your last visit? Include any pap smears or colon screening. No        Chief Complaint   Patient presents with    Complete Physical    PPD Reading     Group Home     He is a 34 y.o. male who presents for evalution. Reviewed PmHx, RxHx, FmHx, SocHx, AllgHx and updated and dated in the chart. Patient Active Problem List    Diagnosis    Lives in group home    Intermittent explosive disorder    HTN (hypertension)    MR (mental retardation)    Allergic rhinitis due to other allergen    Unspecified asthma(493.90)    Diabetes mellitus type 2, controlled ([facility]       Review of Systems - negative except as listed above in the HPI    Objective:     Vitals:    04/02/18 1114   BP: 119/90   Pulse: 76   Resp: 18   Temp: 98.6 °F (37 °C)   TempSrc: Oral   SpO2: 96%   Weight: 186 lb (84.4 kg)   Height: 5' 4\" (1.626 m)     Physical Examination: General appearance - alert, well appearing, and in no distress  Eyes - pupils equal and reactive, extraocular eye movements intact  Ears - bilateral TM's and external ear canals normal  Nose - normal and patent, no erythema, discharge or polyps  Mouth - mucous membranes moist, pharynx normal without lesions  Chest - clear to auscultation, no wheezes, rales or rhonchi, symmetric air entry  Heart - normal rate, regular rhythm, normal S1, S2, no murmurs, rubs, clicks or gallops  Abdomen - soft, nontender, nondistended, no masses or organomegaly  Extremities - peripheral pulses normal, no pedal edema, no clubbing or cyanosis    Assessment/ Plan:   Diagnoses and all orders for this visit:    1.  Routine general medical examination at a health care facility  -labs utd  -forms filled out for Gh    2. PPD screening test  -     AMB POC TUBERCULOSIS, INTRADERMAL (SKIN TEST)       Follow-up Disposition:  Return if symptoms worsen or fail to improve. I have discussed the diagnosis with the patient and the intended plan as seen in the above orders. The patient understands and agrees with the plan. The patient has received an after-visit summary and questions were answered concerning future plans. Medication Side Effects and Warnings were discussed with patient  Patient Labs were reviewed and or requested:  Patient Past Records were reviewed and or requested    Merlinda Means, M.D. There are no Patient Instructions on file for this visit.

## 2018-04-02 NOTE — MR AVS SNAPSHOT
[address] [address] 
[phone] Patient: Terri Zarate MRN: [id number] :1985 Visit Information Date & Time Provider Department Dept. Phone Encounter #  
 2018 10:30 AM Mary Ann Conde MD 5900 [facility] [phone] [id number] Follow-up Instructions Return if symptoms worsen or fail to improve. Upcoming Health Maintenance Date Due DTaP/Tdap/Td series (1 - Tdap) 2005 EYE EXAM RETINAL OR DILATED Q1 3/19/2015 MEDICARE YEARLY EXAM 3/29/2018 MICROALBUMIN Q1 2018 HEMOGLOBIN A1C Q6M 2018 FOOT EXAM Q1 2018 LIPID PANEL Q1 3/6/2019 Allergies as of 2018  Review Complete On: 2018 By: Mary Ann Conde MD  
 No Known Allergies Current Immunizations  Reviewed on 2018 Name Date PPD 3/27/2012, 3/30/2011 TB Skin Test (PPD) Intradermal 2018, 3/17/2015, 3/21/2014, 2013, 2013 Reviewed by Tanna Gallardo LPN on 7064 at 36:09 AM  
You Were Diagnosed With   
  
 Codes Comments Routine general medical examination at a health care facility    -  Primary ICD-10-CM: Z00.00 ICD-9-CM: V70.0   
 PPD screening test     ICD-10-CM: Z11.1 ICD-9-CM: V74.1 Vitals BP Pulse Temp Resp Height(growth percentile) Weight(growth percentile) 119/90 76 98.6 °F (37 °C) (Oral) 18 5' 4\" (1.626 m) 186 lb (84.4 kg) SpO2 BMI Smoking Status 96% 31.93 kg/m2 Never Smoker Vitals History BMI and BSA Data Body Mass Index Body Surface Area  
 31.93 kg/m 2 1.95 m 2 Preferred Pharmacy Pharmacy Name Phone [facility], [address] [phone] Your Updated Medication List  
  
   
This list is accurate as of 18  1:26 PM.  Always use your most recent med list.  
  
  
  
  
 albuterol 2.5 mg /3 mL (0.083 %) nebulizer solution Commonly known as:  PROVENTIL VENTOLIN  
3 mL by Nebulization route every four (4) hours as needed for Wheezing. atenolol 25 mg tablet Commonly known as:  TENORMIN  
TAKE 1 TABLET BY MOUTH DAILY  
  
 bisacodyl 5 mg EC tablet Commonly known as:  DULCOLAX (BISACODYL) Take 1 Tab by mouth daily as needed for Constipation. divalproex  mg tablet Commonly known as:  DEPAKOTE  
  
 DOC-Q-LACE 100 mg capsule Generic drug:  docusate sodium TAKE 1 CAPSULE TWICE DAILY AS NEEDED FOR CONGESTION  
  
 fluticasone 50 mcg/actuation nasal spray Commonly known as:  Corinne Coffee INHALE 2 SPRAYS INTO EACH NOSTRIL ONCE DAILY  
  
 guaiFENesin-dextromethorphan 100-10 mg/5 mL syrup Commonly known as:  ROBITUSSIN-DM Take 10 mL by mouth four (4) times daily as needed for Cough. hydrocortisone 1 % ointment Commonly known as:  HYCORT Apply  to affected area two (2) times a day. use thin layer  
  
 ibuprofen 600 mg tablet Commonly known as:  MOTRIN Take  by mouth every six (6) hours as needed. inhalational spacing device Commonly known as:  SPACE CHAMBER PLUS  
1 Each by Does Not Apply route as needed. For use with albuterol inhaler. lithium carbonate 300 mg capsule Take 300 mg by mouth two (2) times daily (with meals). loratadine 10 mg tablet Commonly known as:  CLARITIN  
TAKE 1 TABLET BY MOUTH DAILY FOR ALLERGIES  
  
 magnesium hydroxide 400 mg/5 mL suspension Commonly known as:  MILK OF MAGNESIA Take 30 mL by mouth daily as needed. MYLANTA MAXIMUM STRENGTH 400-400-40 mg/5 mL suspension Generic drug:  aluminum & magnesium hydroxide-simethicone Take 10 mL by mouth every six (6) hours as needed. Nebulizer & Compressor machine For the administration of albuterol every 4 hours as needed for wheezing. Diagnosis code J45.31.  
  
 ondansetron hcl 4 mg tablet Commonly known as:  Cee Maxin Take 4 mg by mouth every six (6) hours. pramoxine-mineral oil-zinc 1-46.6-12.5 % rectal ointment Commonly known as:  TUCKS  
by PeriANAL route every four (4) hours as needed for Hemorrhoids, Pain and Itching. risperiDONE 4 mg tablet Commonly known as:  RisperDAL Take 2 mg by mouth daily. Senna 8.6 mg Cap Generic drug:  sennosides Take 1 tablet by mouth nightly as needed. temazepam 15 mg capsule Commonly known as:  RESTORIL Take  by mouth nightly as needed. TYLENOL 325 mg tablet Generic drug:  acetaminophen Take  by mouth every four (4) hours as needed. We Performed the Following AMB POC TUBERCULOSIS, INTRADERMAL (SKIN TEST) [01793 CPT(R)] Follow-up Instructions Return if symptoms worsen or fail to improve. Introducing [facility]! [employer] introduces [employer] patient portal. Now you can access parts of your medical record, email your doctor's office, and request medication refills online. 1. In your internet browser, go to [email or web address] 2. Click on the First Time User? Click Here link in the Sign In box. You will see the New Member Sign Up page. 3. Enter your [employer] Access Code exactly as it appears below. You will not need to use this code after youve completed the sign-up process. If you do not sign up before the expiration date, you must request a new code. · [employer] Access Code: [email or web address] Expires: 6/4/2018  3:20 PM 
 
4. Enter the last four digits of your Social Security Number (xxxx) and Date of Birth (mm/dd/yyyy) as indicated and click Submit. You will be taken to the next sign-up page. 5. Create a Multifondst ID. This will be your [employer] login ID and cannot be changed, so think of one that is secure and easy to remember. 6. Create a [employer] password. You can change your password at any time. 7. Enter your Password Reset Question and Answer. This can be used at a later time if you forget your password. 8. Enter your e-mail address. You will receive e-mail notification when new information is available in [address] 9. Click Sign Up. You can now view and download portions of your medical record. 10. Click the Download Summary menu link to download a portable copy of your medical information. If you have questions, please visit the Frequently Asked Questions section of the [facility] website. Remember, [facility] is NOT to be used for urgent needs. For medical emergencies, dial 911. Now available from your iPhone and Android! Please provide this summary of care documentation to your next provider. Your primary care clinician is listed as Tori Canela. If you have any questions after today's visit, please call [phone].

## 2018-04-04 LAB
MM INDURATION POC: 0 MM (ref 0–5)
PPD POC: NORMAL NEGATIVE

## 2018-05-31 RX ORDER — FLUTICASONE PROPIONATE 50 MCG
SPRAY, SUSPENSION (ML) NASAL
Qty: 16 G | Refills: 0 | Status: SHIPPED | OUTPATIENT
Start: 2018-05-31 | End: 2018-06-04 | Stop reason: SDUPTHER

## 2018-06-04 RX ORDER — FLUTICASONE PROPIONATE 50 MCG
SPRAY, SUSPENSION (ML) NASAL
Qty: 16 G | Refills: 11 | Status: SHIPPED | OUTPATIENT
Start: 2018-06-04 | End: 2021-07-28 | Stop reason: SDUPTHER

## 2018-09-11 ENCOUNTER — HOSPITAL ENCOUNTER (OUTPATIENT)
Dept: LAB | Age: 33
Discharge: HOME | End: 2018-09-11
Payer: MEDICARE

## 2018-09-11 ENCOUNTER — OFFICE VISIT (OUTPATIENT)
Dept: FAMILY MEDICINE CLINIC | Age: 33
End: 2018-09-11

## 2018-09-11 VITALS
SYSTOLIC BLOOD PRESSURE: 107 MMHG | HEART RATE: 73 BPM | WEIGHT: 187 LBS | HEIGHT: 64 IN | BODY MASS INDEX: 31.92 KG/M2 | TEMPERATURE: 98 F | DIASTOLIC BLOOD PRESSURE: 74 MMHG | RESPIRATION RATE: 22 BRPM | OXYGEN SATURATION: 95 %

## 2018-09-11 DIAGNOSIS — F63.81 INTERMITTENT EXPLOSIVE DISORDER: ICD-10-CM

## 2018-09-11 DIAGNOSIS — I10 ESSENTIAL HYPERTENSION: ICD-10-CM

## 2018-09-11 DIAGNOSIS — E11.9 CONTROLLED TYPE 2 DIABETES MELLITUS WITHOUT COMPLICATION, WITHOUT LONG-TERM CURRENT USE OF INSULIN (HCC): Primary | Chronic | ICD-10-CM

## 2018-09-11 PROCEDURE — 80061 LIPID PANEL: CPT

## 2018-09-11 PROCEDURE — 85025 COMPLETE CBC W/AUTO DIFF WBC: CPT

## 2018-09-11 PROCEDURE — 84443 ASSAY THYROID STIM HORMONE: CPT

## 2018-09-11 PROCEDURE — 83036 HEMOGLOBIN GLYCOSYLATED A1C: CPT

## 2018-09-11 PROCEDURE — 80053 COMPREHEN METABOLIC PANEL: CPT

## 2018-09-11 NOTE — PROGRESS NOTES
Patient here for 3 month f/u 1. Have you been to the ER, urgent care clinic since your last visit? Hospitalized since your last visit? No 
 
2. Have you seen or consulted any other health care providers outside of the [address] [place] [address] since your last visit? Include any pap smears or colon screening. No  
Lab Results Component Value Date/Time Microalb/Creat ratio (ug/mg creat.) 3.9 03/18/2015 10:21 AM  
  
Chief Complaint Patient presents with  Follow-up 3 month  
 
he is a 34y.o. year old male who presents for evaluation. See Diabetic Report Card listed above. Patient Active Problem List  
 Diagnosis  Lives in group home  Intermittent explosive disorder  HTN (hypertension)  MR (mental retardation)  Allergic rhinitis due to other allergen  Unspecified asthma(493.90)  Diabetes mellitus type 2, controlled ([address] Reviewed PmHx, RxHx, FmHx, SocHx, AllgHx--dated and updated in the chart. Review of Systems - negative except as listed above in the HPI Objective:  
 
Vitals:  
 09/11/18 1013 BP: 107/74 Pulse: 73 Resp: 22 Temp: 98 °F (36.7 °C) SpO2: 95% Weight: 187 lb (84.8 kg) Height: 5' 4\" (1.626 m) Physical Examination: General appearance - alert, well appearing, and in no distress Neck - supple, no significant adenopathy Chest - clear to auscultation, no wheezes, rales or rhonchi, symmetric air entry Heart - normal rate, regular rhythm, normal S1, S2, no murmurs, rubs, clicks or gallops Abdomen - soft, nontender, nondistended, no masses or organomegaly Extremities - peripheral pulses normal, no pedal edema, no clubbing or cyanosis Assessment/ Plan:  
Diagnoses and all orders for this visit: 1. Controlled type 2 diabetes mellitus without complication, without long-term current use of insulin ([address] -     LIPID PANEL 
-     METABOLIC PANEL, COMPREHENSIVE 
-     CBC WITH AUTOMATED DIFF 
-     TSH 3RD GENERATION 
 -     MICROALBUMIN, UR, RAND W/ MICROALB/CREAT RATIO 
-     HEMOGLOBIN A1C WITH EAG 
-at goal 
 
2. Essential hypertension -     LIPID PANEL 
-     METABOLIC PANEL, COMPREHENSIVE 
-at goal 
 
3. Intermittent explosive disorder 
-stable 4. Lives in group home 
-forms filled out Follow-up Disposition: 
Return in about 6 months (around 3/11/2019) for dm. Lab Results Component Value Date/Time Cholesterol, total 127 03/06/2018 02:21 PM  
 HDL Cholesterol 27 (L) 03/06/2018 02:21 PM  
 LDL, calculated 64 03/06/2018 02:21 PM  
 Triglyceride 178 (H) 03/06/2018 02:21 PM  
 
Lab Results Component Value Date/Time Hemoglobin A1c 5.7 (H) 03/06/2018 02:21 PM  
 Hemoglobin A1c 5.7 (H) 12/06/2017 10:55 AM  
 Hemoglobin A1c 6.0 (H) 08/14/2017 11:40 AM  
 Microalb/Creat ratio (ug/mg creat.) 3.9 03/18/2015 10:21 AM  
 LDL, calculated 64 03/06/2018 02:21 PM  
 Creatinine 0.82 03/06/2018 02:21 PM  
  
 
 
Discussed with patient goal of Diabetes to include:  HgA1C <7, LDL cholesterol <100, Blood pressure <140/80. Discussed with patient diet and weight management and to get regular exercise. Recommend yearly eye exams and daily foot care. The patient understands and agrees with the plan. I have discussed the diagnosis with the patient and the intended plan as seen in the above orders. The patient has received an after-visit summary and questions were answered concerning future plans. Medication Side Effects and Warnings were discussed with patient Patient Labs were reviewed and or requested Patient Past Records were reviewed and or requested Luna Sherman M.D. [address] [facility] There are no Patient Instructions on file for this visit.

## 2018-09-11 NOTE — MR AVS SNAPSHOT
[address] [address] 
[phone] Patient: Sincere Guerrero MRN: [id number] :1985 Visit Information Date & Time Provider Department Dept. Phone Encounter #  
 2018  9:40 AM Carlita Orozco MD [address] [facility] [phone] [id number] Follow-up Instructions Return in about 6 months (around 3/11/2019) for dm. Upcoming Health Maintenance Date Due DTaP/Tdap/Td series (1 - Tdap) 2005 EYE EXAM RETINAL OR DILATED Q1 3/19/2015 MICROALBUMIN Q1 2018 HEMOGLOBIN A1C Q6M 2018 Influenza Age 5 to Adult 2019* FOOT EXAM Q1 2018 LIPID PANEL Q1 3/6/2019 MEDICARE YEARLY EXAM 4/3/2019 *Topic was postponed. The date shown is not the original due date. Allergies as of 2018  Review Complete On: 2018 By: Carlita Orozco MD  
 No Known Allergies Current Immunizations  Reviewed on 2018 Name Date PPD 3/27/2012, 3/30/2011 TB Skin Test (PPD) Intradermal 2018, 3/17/2015, 3/21/2014, 2013, 2013 Not reviewed this visit You Were Diagnosed With   
  
 Codes Comments Controlled type 2 diabetes mellitus without complication, without long-term current use of insulin ([facility]    -  Primary ICD-10-CM: E11.9 ICD-9-CM: 250.00 Essential hypertension     ICD-10-CM: I10 
ICD-9-CM: 401.9 Intermittent explosive disorder     ICD-10-CM: F63.81 ICD-9-CM: 312.34 Lives in group home     ICD-10-CM: Z59.3 ICD-9-CM: V60.6 Vitals BP Pulse Temp Resp Height(growth percentile) Weight(growth percentile) 107/74 73 98 °F (36.7 °C) 22 5' 4\" (1.626 m) 187 lb (84.8 kg) SpO2 BMI Smoking Status 95% 32.1 kg/m2 Never Smoker Vitals History BMI and BSA Data Body Mass Index Body Surface Area  
 32.1 kg/m 2 1.96 m 2 Preferred Pharmacy Pharmacy Name Phone [facility] [address] [phone] Your Updated Medication List  
  
   
This list is accurate as of 9/11/18 10:24 AM.  Always use your most recent med list.  
  
  
  
  
 albuterol 2.5 mg /3 mL (0.083 %) nebulizer solution Commonly known as:  PROVENTIL VENTOLIN  
3 mL by Nebulization route every four (4) hours as needed for Wheezing. atenolol 25 mg tablet Commonly known as:  TENORMIN  
TAKE 1 TABLET BY MOUTH DAILY  
  
 bisacodyl 5 mg EC tablet Commonly known as:  DULCOLAX (BISACODYL) Take 1 Tab by mouth daily as needed for Constipation. divalproex  mg tablet Commonly known as:  DEPAKOTE  
  
 DOC-Q-LACE 100 mg capsule Generic drug:  docusate sodium TAKE 1 CAPSULE TWICE DAILY AS NEEDED FOR CONGESTION  
  
 fluticasone 50 mcg/actuation nasal spray Commonly known as:  Ml Shames INHALE 2 SPRAYS INTO EACH NOSTRIL ONCE DAILY  
  
 guaiFENesin-dextromethorphan 100-10 mg/5 mL syrup Commonly known as:  ROBITUSSIN-DM Take 10 mL by mouth four (4) times daily as needed for Cough. hydrocortisone 1 % ointment Commonly known as:  HYCORT Apply  to affected area two (2) times a day. use thin layer  
  
 ibuprofen 600 mg tablet Commonly known as:  MOTRIN Take  by mouth every six (6) hours as needed. inhalational spacing device Commonly known as:  SPACE CHAMBER PLUS  
1 Each by Does Not Apply route as needed. For use with albuterol inhaler. lithium carbonate 300 mg capsule Take 300 mg by mouth two (2) times daily (with meals). loratadine 10 mg tablet Commonly known as:  CLARITIN  
TAKE 1 TABLET BY MOUTH DAILY FOR ALLERGIES  
  
 magnesium hydroxide 400 mg/5 mL suspension Commonly known as:  MILK OF MAGNESIA Take 30 mL by mouth daily as needed. MYLANTA MAXIMUM STRENGTH 400-400-40 mg/5 mL suspension Generic drug:  aluminum & magnesium hydroxide-simethicone Take 10 mL by mouth every six (6) hours as needed. Nebulizer & Compressor machine For the administration of albuterol every 4 hours as needed for wheezing. Diagnosis code J45.31.  
  
 ondansetron hcl 4 mg tablet Commonly known as:  Tripathi Melgar Take 4 mg by mouth every six (6) hours. pramoxine-mineral oil-zinc 1-46.6-12.5 % rectal ointment Commonly known as:  TUCKS  
by PeriANAL route every four (4) hours as needed for Hemorrhoids, Pain and Itching. risperiDONE 4 mg tablet Commonly known as:  RisperDAL Take 2 mg by mouth daily. Senna 8.6 mg Cap Generic drug:  sennosides Take 1 tablet by mouth nightly as needed. temazepam 15 mg capsule Commonly known as:  RESTORIL Take  by mouth nightly as needed. TYLENOL 325 mg tablet Generic drug:  acetaminophen Take  by mouth every four (4) hours as needed. We Performed the Following CBC WITH AUTOMATED DIFF [45561 CPT(R)] HEMOGLOBIN A1C WITH EAG [65336 CPT(R)] LIPID PANEL [80248 CPT(R)] METABOLIC PANEL, COMPREHENSIVE [89978 CPT(R)] MICROALBUMIN, UR, RAND W/ MICROALB/CREAT RATIO [id number] CPT(R)] TSH 3RD GENERATION [14750 CPT(R)] Follow-up Instructions Return in about 6 months (around 3/11/2019) for dm. Introducing [facility] & HEALTH SERVICES! Jeanette Beckman introduces [employer] patient portal. Now you can access parts of your medical record, email your doctor's office, and request medication refills online. 1. In your internet browser, go to [email or web address] 2. Click on the First Time User? Click Here link in the Sign In box. You will see the New Member Sign Up page. 3. Enter your [employer] Access Code exactly as it appears below. You will not need to use this code after youve completed the sign-up process. If you do not sign up before the expiration date, you must request a new code. · [employer] Access Code: [email or web address] Expires: 12/10/2018 10:24 AM 
 
 4. Enter the last four digits of your Social Security Number (xxxx) and Date of Birth (mm/dd/yyyy) as indicated and click Submit. You will be taken to the next sign-up page. 5. Create a [employer] ID. This will be your [employer] login ID and cannot be changed, so think of one that is secure and easy to remember. 6. Create a [employer] password. You can change your password at any time. 7. Enter your Password Reset Question and Answer. This can be used at a later time if you forget your password. 8. Enter your e-mail address. You will receive e-mail notification when new information is available in [address] 9. Click Sign Up. You can now view and download portions of your medical record. 10. Click the Download Summary menu link to download a portable copy of your medical information. If you have questions, please visit the Frequently Asked Questions section of the [employer] website. Remember, [employer] is NOT to be used for urgent needs. For medical emergencies, dial 911. Now available from your iPhone and Android! Please provide this summary of care documentation to your next provider. Your primary care clinician is listed as Tori Canela. If you have any questions after today's visit, please call [phone].

## 2018-09-12 LAB
ALBUMIN SERPL-MCNC: 4.7 G/DL (ref 3.5–5.5)
ALBUMIN/GLOB SERPL: 2.1 {RATIO} (ref 1.2–2.2)
ALP SERPL-CCNC: 85 IU/L (ref 39–117)
ALT SERPL-CCNC: 14 IU/L (ref 0–44)
AST SERPL-CCNC: 15 IU/L (ref 0–40)
BASOPHILS # BLD AUTO: 0 X10E3/UL (ref 0–0.2)
BASOPHILS NFR BLD AUTO: 0 %
BILIRUB SERPL-MCNC: 0.4 MG/DL (ref 0–1.2)
BUN SERPL-MCNC: 14 MG/DL (ref 6–20)
BUN/CREAT SERPL: 16 (ref 9–20)
CALCIUM SERPL-MCNC: 9.6 MG/DL (ref 8.7–10.2)
CHLORIDE SERPL-SCNC: 106 MMOL/L (ref 96–106)
CHOLEST SERPL-MCNC: 138 MG/DL (ref 100–199)
CO2 SERPL-SCNC: 22 MMOL/L (ref 20–29)
CREAT SERPL-MCNC: 0.87 MG/DL (ref 0.76–1.27)
EOSINOPHIL # BLD AUTO: 0.1 X10E3/UL (ref 0–0.4)
EOSINOPHIL NFR BLD AUTO: 1 %
ERYTHROCYTE [DISTWIDTH] IN BLOOD BY AUTOMATED COUNT: 13.1 % (ref 12.3–15.4)
EST. AVERAGE GLUCOSE BLD GHB EST-MCNC: 123 MG/DL
GLOBULIN SER CALC-MCNC: 2.2 G/DL (ref 1.5–4.5)
GLUCOSE SERPL-MCNC: 118 MG/DL (ref 65–99)
HBA1C MFR BLD: 5.9 % (ref 4.8–5.6)
HCT VFR BLD AUTO: 45.7 % (ref 37.5–51)
HDLC SERPL-MCNC: 30 MG/DL
HGB BLD-MCNC: 15.1 G/DL (ref 13–17.7)
IMM GRANULOCYTES # BLD: 0 X10E3/UL (ref 0–0.1)
IMM GRANULOCYTES NFR BLD: 0 %
INTERPRETATION, 910389: NORMAL
LDLC SERPL CALC-MCNC: 78 MG/DL (ref 0–99)
LYMPHOCYTES # BLD AUTO: 2.2 X10E3/UL (ref 0.7–3.1)
LYMPHOCYTES NFR BLD AUTO: 32 %
Lab: NORMAL
MCH RBC QN AUTO: 30 PG (ref 26.6–33)
MCHC RBC AUTO-ENTMCNC: 33 G/DL (ref 31.5–35.7)
MCV RBC AUTO: 91 FL (ref 79–97)
MONOCYTES # BLD AUTO: 0.4 X10E3/UL (ref 0.1–0.9)
MONOCYTES NFR BLD AUTO: 6 %
NEUTROPHILS # BLD AUTO: 4.2 X10E3/UL (ref 1.4–7)
NEUTROPHILS NFR BLD AUTO: 61 %
PLATELET # BLD AUTO: 159 X10E3/UL (ref 150–379)
POTASSIUM SERPL-SCNC: 4.6 MMOL/L (ref 3.5–5.2)
PROT SERPL-MCNC: 6.9 G/DL (ref 6–8.5)
RBC # BLD AUTO: 5.04 X10E6/UL (ref 4.14–5.8)
SODIUM SERPL-SCNC: 143 MMOL/L (ref 134–144)
TRIGL SERPL-MCNC: 151 MG/DL (ref 0–149)
TSH SERPL DL<=0.005 MIU/L-ACNC: 3.96 UIU/ML (ref 0.45–4.5)
VLDLC SERPL CALC-MCNC: 30 MG/DL (ref 5–40)
WBC # BLD AUTO: 6.9 X10E3/UL (ref 3.4–10.8)

## 2018-09-12 NOTE — PROGRESS NOTES
A letter was sent to the patient at the address on file, with lab results and Dr. Darling Blackwell recommendations for the patient.

## 2018-09-12 NOTE — PROGRESS NOTES
After reviewing your labs, I believe they are within normal 
limits for your age and let us stay with our current plan of action. If you have any questions, feel free to email thru \[facility]\"" & [facility] SERVICES, or give us  
a call back. Anabell Vicente M.D. Good Help to Those in Need \"You maybe whatever you resolve to be\"

## 2018-10-08 ENCOUNTER — DOCUMENTATION ONLY (OUTPATIENT)
Dept: FAMILY MEDICINE CLINIC | Age: 33
End: 2018-10-08

## 2018-10-08 NOTE — PROGRESS NOTES
Home Care Delivered CMN for durable medical was signed & faxed to [phone], ok, Copy placed in scan folder to be scanned to chart.

## 2018-11-07 ENCOUNTER — OFFICE VISIT (OUTPATIENT)
Dept: FAMILY MEDICINE CLINIC | Age: 33
End: 2018-11-07

## 2018-11-07 VITALS
HEIGHT: 64 IN | SYSTOLIC BLOOD PRESSURE: 136 MMHG | BODY MASS INDEX: 32.56 KG/M2 | TEMPERATURE: 98.7 F | OXYGEN SATURATION: 92 % | DIASTOLIC BLOOD PRESSURE: 91 MMHG | WEIGHT: 190.7 LBS | HEART RATE: 80 BPM | RESPIRATION RATE: 18 BRPM

## 2018-11-07 DIAGNOSIS — I10 ESSENTIAL HYPERTENSION: ICD-10-CM

## 2018-11-07 DIAGNOSIS — F79 MR (MENTAL RETARDATION): ICD-10-CM

## 2018-11-07 NOTE — PROGRESS NOTES
Chief Complaint   Patient presents with    Breast Mass     Left side of chest    Diabetes    Hypertension     1. Have you been to the ER, urgent care clinic since your last visit? Hospitalized since your last visit? No    2. Have you seen or consulted any other health care providers outside of the [address] [place] [address] since your last visit? Include any pap smears or colon screening. No      Chief Complaint   Patient presents with    Breast Mass     Left side of chest    Diabetes    Hypertension     He is a 34 y.o. male who presents for evalution. Reviewed PmHx, RxHx, FmHx, SocHx, AllgHx and updated and dated in the chart. Patient Active Problem List    Diagnosis    Lives in group home    Intermittent explosive disorder    HTN (hypertension)    MR (mental retardation)    Allergic rhinitis due to other allergen    Unspecified asthma(493.90)    Diabetes mellitus type 2, controlled ([facility]       Review of Systems - negative except as listed above in the HPI    Objective:     Vitals:    11/07/18 1413   BP: (!) 136/91   Pulse: 80   Resp: 18   Temp: 98.7 °F (37.1 °C)   TempSrc: Oral   SpO2: 92%   Weight: 190 lb 11.2 oz (86.5 kg)   Height: 5' 4\" (1.626 m)     Physical Examination: General appearance - alert, well appearing, and in no distress  Chest - clear to auscultation, no wheezes, rales or rhonchi, symmetric air entry  Heart - normal rate, regular rhythm, normal S1, S2, no murmurs, rubs, clicks or gallops  Abdomen - soft, nontender, nondistended, no masses or organomegaly  Skin - normal coloration and turgor, no rashes, no suspicious skin lesions noted      Assessment/ Plan:   Diagnoses and all orders for this visit:    1. Lump  -neg exam  -observe    2. Essential hypertension  -at goal    3. MR (mental retardation)  -at   [address]     Follow-up Disposition:  Return if symptoms worsen or fail to improve.     I have discussed the diagnosis with the patient and the intended plan as seen in the above orders. The patient understands and agrees with the plan. The patient has received an after-visit summary and questions were answered concerning future plans. Medication Side Effects and Warnings were discussed with patient  Patient Labs were reviewed and or requested:  Patient Past Records were reviewed and or requested    Dinah Lundy M.D. There are no Patient Instructions on file for this visit.

## 2018-11-07 NOTE — PROGRESS NOTES
Chief Complaint   Patient presents with    Breast Mass     Left side of chest    Diabetes    Hypertension     1. Have you been to the ER, urgent care clinic since your last visit? Hospitalized since your last visit? No    2. Have you seen or consulted any other health care providers outside of the [facility] since your last visit? Include any pap smears or colon screening.  No

## 2018-12-12 ENCOUNTER — HOSPITAL ENCOUNTER (OUTPATIENT)
Dept: LAB | Age: 33
Discharge: HOME | End: 2018-12-12
Payer: MEDICARE

## 2018-12-12 ENCOUNTER — OFFICE VISIT (OUTPATIENT)
Dept: FAMILY MEDICINE CLINIC | Age: 33
End: 2018-12-12

## 2018-12-12 VITALS
BODY MASS INDEX: 32.44 KG/M2 | HEIGHT: 64 IN | DIASTOLIC BLOOD PRESSURE: 79 MMHG | TEMPERATURE: 98.4 F | SYSTOLIC BLOOD PRESSURE: 113 MMHG | WEIGHT: 190 LBS | HEART RATE: 94 BPM | OXYGEN SATURATION: 95 % | RESPIRATION RATE: 20 BRPM

## 2018-12-12 DIAGNOSIS — I10 ESSENTIAL HYPERTENSION: ICD-10-CM

## 2018-12-12 DIAGNOSIS — F79 MR (MENTAL RETARDATION): Chronic | ICD-10-CM

## 2018-12-12 DIAGNOSIS — E11.9 CONTROLLED TYPE 2 DIABETES MELLITUS WITHOUT COMPLICATION, WITHOUT LONG-TERM CURRENT USE OF INSULIN (HCC): Primary | Chronic | ICD-10-CM

## 2018-12-12 PROCEDURE — 80061 LIPID PANEL: CPT

## 2018-12-12 PROCEDURE — 83036 HEMOGLOBIN GLYCOSYLATED A1C: CPT

## 2018-12-12 PROCEDURE — 84443 ASSAY THYROID STIM HORMONE: CPT

## 2018-12-12 PROCEDURE — 82043 UR ALBUMIN QUANTITATIVE: CPT

## 2018-12-12 PROCEDURE — 85025 COMPLETE CBC W/AUTO DIFF WBC: CPT

## 2018-12-12 PROCEDURE — 80053 COMPREHEN METABOLIC PANEL: CPT

## 2018-12-12 NOTE — PROGRESS NOTES
Patient here for 3 month follow up. 1. Have you been to the ER, urgent care clinic since your last visit? Hospitalized since your last visit? No    2. Have you seen or consulted any other health care providers outside of the [address] [place] [address] since your last visit? Include any pap smears or colon screening. No     Lab Results   Component Value Date/Time    Microalb/Creat ratio (ug/mg creat.) 3.9 03/18/2015 10:21 AM      Chief Complaint   Patient presents with    Follow-up     3 month f/u     he is a 28y.o. year old male who presents for evaluation. See Diabetic Report Card listed above. Patient Active Problem List    Diagnosis    Lives in group home    Intermittent explosive disorder    HTN (hypertension)    MR (mental retardation)    Allergic rhinitis due to other allergen    Unspecified asthma(493.90)    Diabetes mellitus type 2, controlled ([facility]       Reviewed PmHx, RxHx, FmHx, SocHx, AllgHx--dated and updated in the chart. Review of Systems - negative except as listed above in the HPI    Objective:     Vitals:    12/12/18 1433   BP: 113/79   Pulse: 94   Resp: 20   Temp: 98.4 °F (36.9 °C)   SpO2: 95%   Weight: 190 lb (86.2 kg)   Height: 5' 4\" (1.626 m)     Physical Examination: General appearance - alert, well appearing, and in no distress  Nose - normal and patent, no erythema, discharge or polyps  Neck - supple, no significant adenopathy  Chest - clear to auscultation, no wheezes, rales or rhonchi, symmetric air entry  Heart - normal rate, regular rhythm, normal S1, S2, no murmurs, rubs, clicks or gallops  Abdomen - soft, nontender, nondistended, no masses or organomegaly    Assessment/ Plan:   Diagnoses and all orders for this visit:    1.  Controlled type 2 diabetes mellitus without complication, without long-term current use of insulin ([facility])  -     LIPID PANEL  -     METABOLIC PANEL, COMPREHENSIVE  -     MICROALBUMIN, UR, RAND W/ MICROALB/CREAT RATIO  -     HEMOGLOBIN A1C WITH EAG  -     CBC WITH AUTOMATED DIFF  -     TSH 3RD GENERATION  -at goal    2. Essential hypertension  -     LIPID PANEL  -     METABOLIC PANEL, COMPREHENSIVE  -at goal    3. MR (mental retardation)  -stable    4. Lives in group home       Follow-up Disposition:  Return in about 3 months (around 3/12/2019) for dm. Lab Results   Component Value Date/Time    Cholesterol, total 138 09/11/2018 10:27 AM    HDL Cholesterol 30 (L) 09/11/2018 10:27 AM    LDL, calculated 78 09/11/2018 10:27 AM    Triglyceride 151 (H) 09/11/2018 10:27 AM     Lab Results   Component Value Date/Time    Hemoglobin A1c 5.9 (H) 09/11/2018 10:27 AM    Hemoglobin A1c 5.7 (H) 03/06/2018 02:21 PM    Hemoglobin A1c 5.7 (H) 12/06/2017 10:55 AM    Microalb/Creat ratio (ug/mg creat.) 3.9 03/18/2015 10:21 AM    LDL, calculated 78 09/11/2018 10:27 AM    Creatinine 0.87 09/11/2018 10:27 AM          Discussed with patient goal of Diabetes to include:  HgA1C <7, LDL cholesterol <100, Blood pressure <140/80. Discussed with patient diet and weight management and to get regular exercise. Recommend yearly eye exams and daily foot care. The patient understands and agrees with the plan. I have discussed the diagnosis with the patient and the intended plan as seen in the above orders. The patient has received an after-visit summary and questions were answered concerning future plans. Medication Side Effects and Warnings were discussed with patient  Patient Labs were reviewed and or requested  Patient Past Records were reviewed and or requested    Raúl Tran M.D. [address] [facility]    There are no Patient Instructions on file for this visit.

## 2018-12-13 LAB
ALBUMIN SERPL-MCNC: 4.3 G/DL (ref 3.5–5.5)
ALBUMIN/GLOB SERPL: 1.9 {RATIO} (ref 1.2–2.2)
ALP SERPL-CCNC: 92 IU/L (ref 39–117)
ALT SERPL-CCNC: 14 IU/L (ref 0–44)
AST SERPL-CCNC: 14 IU/L (ref 0–40)
BASOPHILS # BLD AUTO: 0 X10E3/UL (ref 0–0.2)
BASOPHILS NFR BLD AUTO: 0 %
BILIRUB SERPL-MCNC: 0.4 MG/DL (ref 0–1.2)
BUN SERPL-MCNC: 13 MG/DL (ref 6–20)
BUN/CREAT SERPL: 17 (ref 9–20)
CALCIUM SERPL-MCNC: 9.1 MG/DL (ref 8.7–10.2)
CHLORIDE SERPL-SCNC: 105 MMOL/L (ref 96–106)
CHOLEST SERPL-MCNC: 125 MG/DL (ref 100–199)
CO2 SERPL-SCNC: 19 MMOL/L (ref 20–29)
CREAT SERPL-MCNC: 0.77 MG/DL (ref 0.76–1.27)
CREAT UR-MCNC: NORMAL MG/DL
EOSINOPHIL # BLD AUTO: 0.1 X10E3/UL (ref 0–0.4)
EOSINOPHIL NFR BLD AUTO: 1 %
ERYTHROCYTE [DISTWIDTH] IN BLOOD BY AUTOMATED COUNT: 13.1 % (ref 12.3–15.4)
EST. AVERAGE GLUCOSE BLD GHB EST-MCNC: 123 MG/DL
GLOBULIN SER CALC-MCNC: 2.3 G/DL (ref 1.5–4.5)
GLUCOSE SERPL-MCNC: 149 MG/DL (ref 65–99)
HBA1C MFR BLD: 5.9 % (ref 4.8–5.6)
HCT VFR BLD AUTO: 42.6 % (ref 37.5–51)
HDLC SERPL-MCNC: 29 MG/DL
HGB BLD-MCNC: 15 G/DL (ref 13–17.7)
IMM GRANULOCYTES # BLD: 0.1 X10E3/UL (ref 0–0.1)
IMM GRANULOCYTES NFR BLD: 1 %
INTERPRETATION, 910389: NORMAL
LDLC SERPL CALC-MCNC: 59 MG/DL (ref 0–99)
LYMPHOCYTES # BLD AUTO: 2 X10E3/UL (ref 0.7–3.1)
LYMPHOCYTES NFR BLD AUTO: 20 %
Lab: NORMAL
MCH RBC QN AUTO: 31.9 PG (ref 26.6–33)
MCHC RBC AUTO-ENTMCNC: 35.2 G/DL (ref 31.5–35.7)
MCV RBC AUTO: 91 FL (ref 79–97)
MICROALBUMIN UR-MCNC: NORMAL
MONOCYTES # BLD AUTO: 0.8 X10E3/UL (ref 0.1–0.9)
MONOCYTES NFR BLD AUTO: 8 %
NEUTROPHILS # BLD AUTO: 7 X10E3/UL (ref 1.4–7)
NEUTROPHILS NFR BLD AUTO: 70 %
PLATELET # BLD AUTO: 147 X10E3/UL (ref 150–379)
POTASSIUM SERPL-SCNC: 4.3 MMOL/L (ref 3.5–5.2)
PROT SERPL-MCNC: 6.6 G/DL (ref 6–8.5)
RBC # BLD AUTO: 4.7 X10E6/UL (ref 4.14–5.8)
SODIUM SERPL-SCNC: 138 MMOL/L (ref 134–144)
TRIGL SERPL-MCNC: 185 MG/DL (ref 0–149)
TSH SERPL DL<=0.005 MIU/L-ACNC: 2.64 UIU/ML (ref 0.45–4.5)
VLDLC SERPL CALC-MCNC: 37 MG/DL (ref 5–40)
WBC # BLD AUTO: 9.9 X10E3/UL (ref 3.4–10.8)

## 2019-01-30 RX ORDER — ATENOLOL 25 MG/1
TABLET ORAL
Qty: 31 TAB | Status: SHIPPED | OUTPATIENT
Start: 2019-01-30 | End: 2020-01-02 | Stop reason: SDUPTHER

## 2019-02-27 RX ORDER — LORATADINE 10 MG/1
TABLET ORAL
Qty: 28 TAB | Status: SHIPPED | OUTPATIENT
Start: 2019-02-27 | End: 2020-01-30

## 2019-04-09 ENCOUNTER — OFFICE VISIT (OUTPATIENT)
Dept: FAMILY MEDICINE CLINIC | Age: 34
End: 2019-04-09

## 2019-04-09 VITALS
BODY MASS INDEX: 31.58 KG/M2 | OXYGEN SATURATION: 95 % | WEIGHT: 185 LBS | SYSTOLIC BLOOD PRESSURE: 114 MMHG | TEMPERATURE: 98.2 F | HEIGHT: 64 IN | HEART RATE: 68 BPM | RESPIRATION RATE: 18 BRPM | DIASTOLIC BLOOD PRESSURE: 79 MMHG

## 2019-04-09 DIAGNOSIS — Z23 ENCOUNTER FOR IMMUNIZATION: ICD-10-CM

## 2019-04-09 DIAGNOSIS — Z11.1 SCREENING EXAMINATION FOR PULMONARY TUBERCULOSIS: ICD-10-CM

## 2019-04-09 DIAGNOSIS — Z00.00 MEDICARE ANNUAL WELLNESS VISIT, SUBSEQUENT: Primary | ICD-10-CM

## 2019-04-09 DIAGNOSIS — I10 ESSENTIAL HYPERTENSION: ICD-10-CM

## 2019-04-09 DIAGNOSIS — Z13.39 SCREENING FOR ALCOHOLISM: ICD-10-CM

## 2019-04-09 RX ORDER — ALBUTEROL SULFATE 90 UG/1
AEROSOL, METERED RESPIRATORY (INHALATION)
COMMUNITY
End: 2019-05-01 | Stop reason: SDUPTHER

## 2019-04-09 NOTE — PATIENT INSTRUCTIONS
Medicare Wellness Visit, Male The best way to live healthy is to have a lifestyle where you eat a well-balanced diet, exercise regularly, limit alcohol use, and quit all forms of tobacco/nicotine, if applicable. Regular preventive services are another way to keep healthy. Preventive services (vaccines, screening tests, monitoring & exams) can help personalize your care plan, which helps you manage your own care. Screening tests can find health problems at the earliest stages, when they are easiest to treat. [facility] follows the current, evidence-based guidelines published by the [facility] Leeroy Lisa ([facility]) when recommending preventive services for our patients. Because we follow these guidelines, sometimes recommendations change over time as research supports it. (For example, a prostate screening blood test is no longer routinely recommended for men with no symptoms.) Of course, you and your doctor may decide to screen more often for some diseases, based on your risk and co-morbidities (chronic disease you are already diagnosed with). Preventive services for you include: - Medicare offers their members a free annual wellness visit, which is time for you and your primary care provider to discuss and plan for your preventive service needs. Take advantage of this benefit every year! 
-All adults over age 72 should receive the recommended pneumonia vaccines. Current USPSTF guidelines recommend a series of two vaccines for the best pneumonia protection.  
-All adults should have a flu vaccine yearly and an ECG.  All adults age 61 and older should receive a shingles vaccine once in their lifetime.   
-All adults age 38-68 who are overweight should have a diabetes screening test once every three years.  
-Other screening tests & preventive services for persons with diabetes include: an eye exam to screen for diabetic retinopathy, a kidney function test, a foot exam, and stricter control over your cholesterol.  
-Cardiovascular screening for adults with routine risk involves an electrocardiogram (ECG) at intervals determined by the provider.  
-Colorectal cancer screening should be done for adults age 54-65 with no increased risk factors for colorectal cancer. There are a number of acceptable methods of screening for this type of cancer. Each test has its own benefits and drawbacks. Discuss with your provider what is most appropriate for you during your annual wellness visit. The different tests include: colonoscopy (considered the best screening method), a fecal occult blood test, a fecal DNA test, and sigmoidoscopy. 
-All adults born between [facility] should be screened once for Hepatitis C. 
-An Abdominal Aortic Aneurysm (AAA) Screening is recommended for men age 73-68 who has ever smoked in their lifetime. Here is a list of your current Health Maintenance items (your personalized list of preventive services) with a due date: 
Health Maintenance Due Topic Date Due  Pneumococcal Vaccine (1 of 1 - PPSV23) 12/02/1990  
 DTaP/Tdap/Td  (1 - Tdap) 12/02/2005 [address] [place] [address] Eye Exam  03/19/2016 [address] [place] [address] Diabetic Foot Care  12/06/2018 [address] [place] [address] Annual Well Visit  04/03/2019

## 2019-04-09 NOTE — PROGRESS NOTES
Chief Complaint Patient presents with  Complete Physical  
 Labs Patient presents in office today for CPE and fasting labs. Needs to get TB done. Caretaker is requesting to have quantiferon gold drawn. No concerns. 1. Have you been to the ER, urgent care clinic since your last visit? Hospitalized since your last visit? No 
 
2. Have you seen or consulted any other health care providers outside of the [address] [place] [address] since your last visit? Include any pap smears or colon screening. No 
 
Learning Assessment 2/16/2017 PRIMARY LEARNER Patient HIGHEST LEVEL OF EDUCATION - PRIMARY LEARNER  DID NOT GRADUATE HIGH SCHOOL  
BARRIERS PRIMARY LEARNER COGNITIVE  
CO-LEARNER CAREGIVER Yes CO-LEARNER NAME counselor PRIMARY LANGUAGE ENGLISH  
LEARNER PREFERENCE PRIMARY DEMONSTRATION  
ANSWERED BY other RELATIONSHIP LEGAL GUARDIAN

## 2019-04-09 NOTE — PROGRESS NOTES
Mikal Ge is a 28 y.o. male Chief Complaint Patient presents with  Complete Physical  
 Labs  
 pt here for his medicare wellness visit and has recent labs on file which I have reviewed and diabetes has wilmer well controlled. Pt currently on tenormin for hisBP and tolerates this fine. BP remains well controlled on medication. he is a 28y.o. year old male who presents for evalution. Reviewed PmHx, RxHx, FmHx, SocHx, AllgHx and updated and dated in the chart. Review of Systems - negative except as listed above in the HPI Objective:  
 
Vitals:  
 04/09/19 2385 BP: 114/79 Pulse: 68 Resp: 18 Temp: 98.2 °F (36.8 °C) TempSrc: Oral  
SpO2: 95% Weight: 185 lb (83.9 kg) Height: 5' 4\" (1.626 m) Current Outpatient Medications Medication Sig  
 albuterol (VENTOLIN HFA) 90 mcg/actuation inhaler Take  by inhalation.  loratadine (CLARITIN) 10 mg tablet TAKE 1 TABLET BY MOUTH DAILY FOR ALLERGIES  atenolol (TENORMIN) 25 mg tablet TAKE 1 TABLET BY MOUTH DAILY  fluticasone (FLONASE) 50 mcg/actuation nasal spray INHALE 2 SPRAYS INTO EACH NOSTRIL ONCE DAILY  loratadine (CLARITIN) 10 mg tablet TAKE 1 TABLET BY MOUTH DAILY FOR ALLERGIES  
 DOC-Q-LACE 100 mg capsule TAKE 1 CAPSULE TWICE DAILY AS NEEDED FOR CONGESTION  
 Nebulizer & Compressor machine For the administration of albuterol every 4 hours as needed for wheezing. Diagnosis code J45.31.  
 inhalational spacing device (SPACE CHAMBER PLUS) 1 Each by Does Not Apply route as needed. For use with albuterol inhaler.  albuterol (PROVENTIL VENTOLIN) 2.5 mg /3 mL (0.083 %) nebulizer solution 3 mL by Nebulization route every four (4) hours as needed for Wheezing.  divalproex DR (DEPAKOTE) 250 mg tablet  ondansetron hcl (ZOFRAN) 4 mg tablet Take 4 mg by mouth every six (6) hours.  sennosides (SENNA) 8.6 mg cap Take 1 tablet by mouth nightly as needed.  hydrocortisone (HYCORT) 1 % ointment Apply  to affected area two (2) times a day. use thin layer  risperiDONE (RISPERDAL) 4 mg tablet Take 2 mg by mouth daily.  bisacodyl (DULCOLAX, BISACODYL,) 5 mg EC tablet Take 1 Tab by mouth daily as needed for Constipation.  dextromethorphan-guaiFENesin (ROBITUSSIN-DM)  mg/5 mL syrup Take 10 mL by mouth four (4) times daily as needed for Cough.  pramoxine-mineral oil-zinc (TUCKS) 1-12.5 % rectal ointment by PeriANAL route every four (4) hours as needed for Hemorrhoids, Pain and Itching.  lithium carbonate (ESKALITH) 300 mg capsule Take 300 mg by mouth two (2) times daily (with meals).  temazepam (RESTORIL) 15 mg capsule Take  by mouth nightly as needed.  magnesium hydroxide (MILK OF MAGNESIA) 400 mg/5 mL suspension Take 30 mL by mouth daily as needed.  ibuprofen (MOTRIN) 600 mg tablet Take  by mouth every six (6) hours as needed.  aluminum & magnesium hydroxide-simethicone (MYLANTA MAXIMUM STRENGTH) 400-400-40 mg/5 mL suspension Take 10 mL by mouth every six (6) hours as needed.  acetaminophen (TYLENOL) 325 mg tablet Take  by mouth every four (4) hours as needed. No current facility-administered medications for this visit. Physical Examination: General appearance - alert, well appearing, and in no distress Mouth - mucous membranes moist, pharynx normal without lesions Chest - clear to auscultation, no wheezes, rales or rhonchi, symmetric air entry Heart - normal rate, regular rhythm, normal S1, S2, no murmurs, rubs, clicks or gallops Abdomen - soft, nontender, nondistended, no masses or organomegaly Extremities - peripheral pulses normal, no pedal edema, no clubbing or cyanosis Assessment/ Plan:  
Diagnoses and all orders for this visit: 1. Essential hypertension No change in therapy 2. Medicare annual wellness visit, subsequent 3. Screening for alcoholism -     [facility] ANNUAL ALCOHOL SCREEN 15 MIN 
 
 4. Encounter for immunization -     AMB POC TUBERCULOSIS, INTRADERMAL (SKIN TEST) 5. Screening examination for pulmonary tuberculosis -     AMB POC TUBERCULOSIS, INTRADERMAL (SKIN TEST) Follow-up and Dispositions · Return in about 2 months (around 6/9/2019), or if symptoms worsen or fail to improve, for diabetes labs. I have discussed the diagnosis with the patient and the intended plan as seen in the above orders. The patient has received an after-visit summary and questions were answered concerning future plans. Pt conveyed understanding of plan. Medication Side Effects and Warnings were discussed with patient Una Irena, DO This is the Subsequent Medicare Annual Wellness Exam, performed 12 months or more after the Initial AWV or the last Subsequent AWV I have reviewed the patient's medical history in detail and updated the computerized patient record. History Past Medical History:  
Diagnosis Date  Allergic rhinitis due to other allergen 3/24/2010  MR (mental retardation) 3/24/2010  Type II or unspecified type diabetes mellitus without mention of complication, not stated as uncontrolled 3/24/2010  Unspecified asthma(493.90) 3/24/2010 History reviewed. No pertinent surgical history. Current Outpatient Medications Medication Sig Dispense Refill  albuterol (VENTOLIN HFA) 90 mcg/actuation inhaler Take  by inhalation.     
 loratadine (CLARITIN) 10 mg tablet TAKE 1 TABLET BY MOUTH DAILY FOR ALLERGIES 28 Tab PRN  
 atenolol (TENORMIN) 25 mg tablet TAKE 1 TABLET BY MOUTH DAILY 31 Tab PRN  
 fluticasone (FLONASE) 50 mcg/actuation nasal spray INHALE 2 SPRAYS INTO EACH NOSTRIL ONCE DAILY 16 g 11  
 loratadine (CLARITIN) 10 mg tablet TAKE 1 TABLET BY MOUTH DAILY FOR ALLERGIES 28 Tab PRN  
 DOC-Q-LACE 100 mg capsule TAKE 1 CAPSULE TWICE DAILY AS NEEDED FOR CONGESTION 60 Cap 11  
  Nebulizer & Compressor machine For the administration of albuterol every 4 hours as needed for wheezing. Diagnosis code J45.31. 1 Each 0  
 inhalational spacing device (SPACE CHAMBER PLUS) 1 Each by Does Not Apply route as needed. For use with albuterol inhaler. 1 Each 1  
 albuterol (PROVENTIL VENTOLIN) 2.5 mg /3 mL (0.083 %) nebulizer solution 3 mL by Nebulization route every four (4) hours as needed for Wheezing. 1 Package 1  
 divalproex DR (DEPAKOTE) 250 mg tablet  ondansetron hcl (ZOFRAN) 4 mg tablet Take 4 mg by mouth every six (6) hours.  sennosides (SENNA) 8.6 mg cap Take 1 tablet by mouth nightly as needed.  hydrocortisone (HYCORT) 1 % ointment Apply  to affected area two (2) times a day. use thin layer 30 g 0  
 risperiDONE (RISPERDAL) 4 mg tablet Take 2 mg by mouth daily.  bisacodyl (DULCOLAX, BISACODYL,) 5 mg EC tablet Take 1 Tab by mouth daily as needed for Constipation. 30 Tab 5  
 dextromethorphan-guaiFENesin (ROBITUSSIN-DM)  mg/5 mL syrup Take 10 mL by mouth four (4) times daily as needed for Cough. 200 mL 1  
 pramoxine-mineral oil-zinc (TUCKS) 1-12.5 % rectal ointment by PeriANAL route every four (4) hours as needed for Hemorrhoids, Pain and Itching. 30 g 0  
 lithium carbonate (ESKALITH) 300 mg capsule Take 300 mg by mouth two (2) times daily (with meals).  temazepam (RESTORIL) 15 mg capsule Take  by mouth nightly as needed.  magnesium hydroxide (MILK OF MAGNESIA) 400 mg/5 mL suspension Take 30 mL by mouth daily as needed.  ibuprofen (MOTRIN) 600 mg tablet Take  by mouth every six (6) hours as needed.  aluminum & magnesium hydroxide-simethicone (MYLANTA MAXIMUM STRENGTH) 400-400-40 mg/5 mL suspension Take 10 mL by mouth every six (6) hours as needed.  acetaminophen (TYLENOL) 325 mg tablet Take  by mouth every four (4) hours as needed. No Known Allergies Family History Family history unknown: Yes  
 
Social History Tobacco Use  Smoking status: Never Smoker  Smokeless tobacco: Never Used Substance Use Topics  Alcohol use: No  
 
Patient Active Problem List  
Diagnosis Code  MR (mental retardation) F79  
 Allergic rhinitis due to other allergen J30.89  
 Unspecified asthma(493.90) J45.909  Diabetes mellitus type 2, controlled ([facility] [address] E11.9  
 HTN (hypertension) I10  
 Intermittent explosive disorder F63.81  Lives in group home Z59.3 Depression Risk Factor Screening:  
 
3 most recent PHQ Screens 4/9/2019 PHQ Not Done - Little interest or pleasure in doing things Not at all Feeling down, depressed, irritable, or hopeless Not at all Total Score PHQ 2 0 Alcohol Risk Factor Screening: You do not drink alcohol or very rarely. Functional Ability and Level of Safety:  
Hearing Loss Hearing is good. Activities of Daily Living The home contains: no safety equipment. Patient needs help with:  phone, transportation, shopping, preparing meals, laundry, housework, managing medications, managing money, dressing, bathing, hygiene, bathroom needs and walking Fall Risk Fall Risk Assessment, last 12 mths 3/6/2018 Able to walk? Yes Fall in past 12 months? No  
 
 
Abuse Screen Patient is not abused Cognitive Screening Evaluation of Cognitive Function: 
Has your family/caregiver stated any concerns about your memory: no 
Normal 
 
Patient Care Team  
Patient Care Team: 
Jennifer Canela MD as PCP - General (Family Practice) [facility] Assessment/Plan Education and counseling provided: 
Are appropriate based on today's review and evaluation Diagnoses and all orders for this visit: 
 
1. Medicare annual wellness visit, subsequent 2. Screening for alcoholism -     [place] ANNUAL ALCOHOL SCREEN 15 MIN 3. Encounter for immunization -     AMB POC TUBERCULOSIS, INTRADERMAL (SKIN TEST) 4. Screening examination for pulmonary tuberculosis -     AMB POC TUBERCULOSIS, INTRADERMAL (SKIN TEST) 5. Essential hypertension Health Maintenance Due Topic Date Due  Pneumococcal 0-64 years (1 of 1 - PPSV23) 12/02/1990  
 DTaP/Tdap/Td series (1 - Tdap) 12/02/2005  
 EYE EXAM RETINAL OR DILATED  03/19/2016  
 FOOT EXAM Q1  12/06/2018  MEDICARE YEARLY EXAM  04/03/2019 I have discussed the diagnosis with the patient and the intended plan as seen in the above orders. The patient has received an after-visit summary and questions were answered concerning future plans. Pt conveyed understanding of plan. Dr Dannielle Cooks

## 2019-04-11 LAB
MM INDURATION POC: 0 MM (ref 0–5)
PPD POC: NEGATIVE NEGATIVE

## 2019-05-01 RX ORDER — ALBUTEROL SULFATE 90 UG/1
AEROSOL, METERED RESPIRATORY (INHALATION)
Qty: 1 INHALER | Refills: 0 | Status: SHIPPED | OUTPATIENT
Start: 2019-05-01 | End: 2020-12-31

## 2019-05-01 RX ORDER — DOCUSATE SODIUM 100 MG/1
CAPSULE, LIQUID FILLED ORAL
Qty: 60 CAP | Refills: 0 | Status: SHIPPED | OUTPATIENT
Start: 2019-05-01 | End: 2019-12-31

## 2019-06-04 ENCOUNTER — OFFICE VISIT (OUTPATIENT)
Dept: FAMILY MEDICINE CLINIC | Age: 34
End: 2019-06-04

## 2019-06-04 VITALS
RESPIRATION RATE: 18 BRPM | SYSTOLIC BLOOD PRESSURE: 123 MMHG | WEIGHT: 180 LBS | TEMPERATURE: 98.2 F | DIASTOLIC BLOOD PRESSURE: 84 MMHG | OXYGEN SATURATION: 95 % | HEIGHT: 64 IN | BODY MASS INDEX: 30.73 KG/M2 | HEART RATE: 76 BPM

## 2019-06-04 DIAGNOSIS — R19.7 DIARRHEA, UNSPECIFIED TYPE: Primary | ICD-10-CM

## 2019-06-04 DIAGNOSIS — I10 ESSENTIAL HYPERTENSION: ICD-10-CM

## 2019-06-04 NOTE — PROGRESS NOTES
Patient here for hosp f/u 5/18/2019 diarrhea and weakness. 1. Have you been to the ER, urgent care clinic since your last visit? Hospitalized since your last visit? Yes When: 5/18/2019    2. Have you seen or consulted any other health care providers outside of the [address] [place] [address] since your last visit? Include any pap smears or colon screening. No      feels back to baseline      Chief Complaint   Patient presents with   [facility] Follow Up     Adriane 5/18/2019, diarrhea, weakness     He is a 28 y.o. male who presents for evalution. Reviewed PmHx, RxHx, FmHx, SocHx, AllgHx and updated and dated in the chart. Patient Active Problem List    Diagnosis    Lives in group home    Intermittent explosive disorder    HTN (hypertension)    MR (mental retardation)    Allergic rhinitis due to other allergen    Unspecified asthma(493.90)    Diabetes mellitus type 2, controlled ([facility] [address]       Review of Systems - negative except as listed above in the HPI    Objective:     Vitals:    06/04/19 1333   BP: 123/84   Pulse: 76   Resp: 18   Temp: 98.2 °F (36.8 °C)   SpO2: 95%   Weight: 180 lb (81.6 kg)   Height: 5' 4\" (1.626 m)     Physical Examination: General appearance - alert, well appearing, and in no distress  Chest - clear to auscultation, no wheezes, rales or rhonchi, symmetric air entry  Heart - normal rate, regular rhythm, normal S1, S2, no murmurs, rubs, clicks or gallops  Abdomen - soft, nontender, nondistended, no masses or organomegaly    Assessment/ Plan:   Diagnoses and all orders for this visit:    1. Diarrhea, unspecified type  -resolved    2. Essential hypertension  -at goal     Follow-up and Dispositions    · Return if symptoms worsen or fail to improve. I have discussed the diagnosis with the patient and the intended plan as seen in the above orders. The patient understands and agrees with the plan.  The patient has received an after-visit summary and questions were answered concerning future plans. Medication Side Effects and Warnings were discussed with patient  Patient Labs were reviewed and or requested:  Patient Past Records were reviewed and or requested    Gianni Carranza M.D. There are no Patient Instructions on file for this visit.

## 2019-07-02 LAB — CREATININE, EXTERNAL: 0.82

## 2019-07-09 ENCOUNTER — OFFICE VISIT (OUTPATIENT)
Dept: FAMILY MEDICINE CLINIC | Age: 34
End: 2019-07-09

## 2019-07-09 VITALS
BODY MASS INDEX: 30.9 KG/M2 | HEART RATE: 89 BPM | RESPIRATION RATE: 18 BRPM | WEIGHT: 181 LBS | SYSTOLIC BLOOD PRESSURE: 103 MMHG | TEMPERATURE: 98.6 F | OXYGEN SATURATION: 95 % | DIASTOLIC BLOOD PRESSURE: 69 MMHG | HEIGHT: 64 IN

## 2019-07-09 DIAGNOSIS — I10 ESSENTIAL HYPERTENSION: ICD-10-CM

## 2019-07-09 DIAGNOSIS — F79 INTELLECTUAL DISABILITY: ICD-10-CM

## 2019-07-09 DIAGNOSIS — L02.92 BOIL: Primary | ICD-10-CM

## 2019-07-09 PROBLEM — R29.898 WEAKNESS OF BOTH LEGS: Status: ACTIVE | Noted: 2019-07-09

## 2019-07-09 PROBLEM — R26.89 NEED FOR ASSISTANCE DUE TO UNSTEADY GAIT: Status: ACTIVE | Noted: 2019-07-09
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
## 2019-07-09 NOTE — PROGRESS NOTES
Patient here for follow up boil on leg. Patient finishe antibiotics for boil that was prescribed from ED. Patient also here for face to face for manual wheelchair. Letter of Certified Medical Necessity    Patient requires a manual wheelchair with arm rests and foot pedals. Patient has weakness of lower extremities resulting in mobility limitations. Patient has fallen in the past month. No injuries. Patient and staff uses a gait belt but it is not enough to assist patient safely when he is so unsteady. Patient is unable to use a cane or walker due to weakness of lower extremities and balance difficulties. The patient lives in a group home and care givers are  available and willing to safely use manual wheelchair for its purpose of getting the patient around the home safely, and more independently to provide ADL's. Chief Complaint   [facility] with   [facility]     boil on leg     Other     face to face for wheelchair     He is a 28 y.o. male who presents for evalution. Reviewed PmHx, RxHx, FmHx, SocHx, AllgHx and updated and dated in the chart.     Patient Active Problem List    Diagnosis    Weakness of both legs    Need for assistance due to unsteady gait    Falls    Lives in group home    Intermittent explosive disorder    HTN (hypertension)    Intellectual disability    Allergic rhinitis due to other allergen    Unspecified asthma(493.90)    Diabetes mellitus type 2, controlled ([facility] [address]       Review of Systems - negative except as listed above in the HPI    Objective:     Vitals:    07/09/19 1450   BP: 103/69   Pulse: 89   Resp: 18   Temp: 98.6 °F (37 °C)   SpO2: 95%   Weight: 181 lb (82.1 kg)   Height: 5' 4\" (1.626 m)     Physical Examination: General appearance - alert, well appearing, and in no distress  Chest - clear to auscultation, no wheezes, rales or rhonchi, symmetric air entry  Heart - normal rate, regular rhythm, normal S1, S2, no murmurs, rubs, clicks or reina  Skin - healing    Assessment/ Plan:   Diagnoses and all orders for this visit:    1. Boil  -cont plan    2. Essential hypertension  -at goal    3. Intellectual disability           I have discussed the diagnosis with the patient and the intended plan as seen in the above orders. The patient understands and agrees with the plan. The patient has received an after-visit summary and questions were answered concerning future plans. Medication Side Effects and Warnings were discussed with patient  Patient Labs were reviewed and or requested:  Patient Past Records were reviewed and or requested    Ros Quarles M.D. There are no Patient Instructions on file for this visit.

## 2019-07-09 NOTE — LETTER
7/9/2019 3:16 PM 
 
Mr. Robb Postal [place] [phone] Letter of Certified Medical Necessity Patient requires a manual wheelchair with arm rests and foot pedals. Patient has a diagnosis of bilateral lower leg weakness resulting in mobility limitations. Patient is unable to use a cane or walker due to weakness of lower extremities and balance difficulties. Patient also has fallen in the past month, no injuries. Patient lives in a group home and  care giver is available and willing to safely use manual wheelchair for its purpose of getting the patient around the home safely, and more independently to provide ADL's. Sincerely, Andrew Mathews MD

## 2019-07-25 ENCOUNTER — TELEPHONE (OUTPATIENT)
Dept: FAMILY MEDICINE CLINIC | Age: 34
End: 2019-07-25

## 2019-07-25 NOTE — TELEPHONE ENCOUNTER
Jason Foreman from [facility] called and stated that the patient is out of their service area and could you fax the order to their sister company [facility].

## 2019-07-25 NOTE — TELEPHONE ENCOUNTER
Orders refaxed to [facility] due to geographical location of address. [facility] does not service this area.

## 2019-08-22 ENCOUNTER — DOCUMENTATION ONLY (OUTPATIENT)
Dept: FAMILY MEDICINE CLINIC | Age: 34
End: 2019-08-22

## 2019-08-22 NOTE — PROGRESS NOTES
[facility] CMN for durable medical was put on [facility] SUMAYA desk to process

## 2019-08-23 ENCOUNTER — DOCUMENTATION ONLY (OUTPATIENT)
Dept: FAMILY MEDICINE CLINIC | Age: 34
End: 2019-08-23

## 2019-08-23 NOTE — PROGRESS NOTES
Called and spoke [facility] regarding wheelchair order from 7/9/2019.  W/c was approved and delivered to patient 8/20/2019

## 2019-08-26 ENCOUNTER — DOCUMENTATION ONLY (OUTPATIENT)
Dept: FAMILY MEDICINE CLINIC | Age: 34
End: 2019-08-26

## 2019-08-26 NOTE — PROGRESS NOTES
[facility] CMN for durable medical was signed & faxed to [phone],ok,Copy placed in scan folder to be scanned to chart.

## 2019-09-30 RX ORDER — FLUTICASONE PROPIONATE 50 MCG
SPRAY, SUSPENSION (ML) NASAL
Qty: 16 G | Refills: 11 | Status: SHIPPED | OUTPATIENT
Start: 2019-09-30 | End: 2020-10-27

## 2019-12-31 RX ORDER — DOCUSATE SODIUM 100 MG/1
CAPSULE, LIQUID FILLED ORAL
Qty: 60 CAP | Refills: 0 | Status: SHIPPED | OUTPATIENT
Start: 2019-12-31 | End: 2020-12-31

## 2020-01-02 RX ORDER — ATENOLOL 25 MG/1
TABLET ORAL
Qty: 31 TAB | Status: SHIPPED | OUTPATIENT
Start: 2020-01-02 | End: 2020-11-27

## 2020-01-30 RX ORDER — LORATADINE 10 MG/1
TABLET ORAL
Qty: 31 TAB | Status: SHIPPED | OUTPATIENT
Start: 2020-01-30 | End: 2020-12-29

## 2020-08-03 ENCOUNTER — DOCUMENTATION ONLY (OUTPATIENT)
Dept: FAMILY MEDICINE CLINIC | Age: 35
End: 2020-08-03

## 2020-08-04 ENCOUNTER — TELEPHONE (OUTPATIENT)
Dept: FAMILY MEDICINE CLINIC | Age: 35
End: 2020-08-04

## 2020-08-04 NOTE — TELEPHONE ENCOUNTER
[facility] D[facility] CMN form was signed & faxed to [phone],ok,Copy placed in scan folder to be scanned to chart.

## 2020-08-11 ENCOUNTER — VIRTUAL VISIT (OUTPATIENT)
Dept: FAMILY MEDICINE CLINIC | Age: 35
End: 2020-08-11
Payer: MEDICARE

## 2020-08-11 DIAGNOSIS — F79 INTELLECTUAL DISABILITY: Chronic | ICD-10-CM

## 2020-08-11 DIAGNOSIS — R26.89 NEED FOR ASSISTANCE DUE TO UNSTEADY GAIT: ICD-10-CM

## 2020-08-11 DIAGNOSIS — I10 ESSENTIAL HYPERTENSION: ICD-10-CM

## 2020-08-11 DIAGNOSIS — M25.561 ACUTE PAIN OF BOTH KNEES: ICD-10-CM

## 2020-08-11 DIAGNOSIS — Z00.00 ROUTINE GENERAL MEDICAL EXAMINATION AT A HEALTH CARE FACILITY: Primary | ICD-10-CM

## 2020-08-11 DIAGNOSIS — M25.562 ACUTE PAIN OF BOTH KNEES: ICD-10-CM

## 2020-08-11 DIAGNOSIS — E11.9 CONTROLLED TYPE 2 DIABETES MELLITUS WITHOUT COMPLICATION, WITHOUT LONG-TERM CURRENT USE OF INSULIN (HCC): ICD-10-CM

## 2020-08-11 DIAGNOSIS — F63.81 INTERMITTENT EXPLOSIVE DISORDER: ICD-10-CM

## 2020-08-11 PROCEDURE — 99214 OFFICE O/P EST MOD 30 MIN: CPT | Performed by: FAMILY MEDICINE

## 2020-08-11 PROCEDURE — G0439 PPPS, SUBSEQ VISIT: HCPCS | Performed by: FAMILY MEDICINE

## 2020-08-11 RX ORDER — NAPROXEN 500 MG/1
500 TABLET ORAL
Qty: 60 TAB | Refills: 0 | Status: SHIPPED | OUTPATIENT
Start: 2020-08-11 | End: 2020-12-31

## 2020-08-11 NOTE — PROGRESS NOTES
Patient is a group home patient here for a Medicare complete physical examination. In addition he is overdue for his diabetes labs which will get him caught up to date. Patient stable from a mental standpoint does suffer from explosive personality disorder. Overall patient stable at group home.    -Patient having a complaint of one-week history of bilateral knee pain. No trauma to the area no redness no swelling. No prior history of pain in that area. Consent: Alvin López, who was seen by synchronous (real-time) audio-video technology, and/or his healthcare decision maker, is aware that this patient-initiated, Telehealth encounter on 8/11/2020 is a billable service, with coverage as determined by his insurance carrier. He is aware that he may receive a bill and has provided verbal consent to proceed: Yes. Sissy Ehrich is completed and assessed=yes  Depression Screen is completed and assessed=yes  Medication list reviewed and adjusted for accuracy=yes  Immunizations reviewed and updated=yes  Health/Preventative Screenings reviewed and updated=yes  ADL Functions reviewed=yes    712  Subjective:   Alvin López is a 27 y.o. male who was seen for No chief complaint on file. Prior to Admission medications    Medication Sig Start Date End Date Taking?  Authorizing Provider   loratadine (CLARITIN) 10 mg tablet TAKE 1 TABLET BY MOUTH DAILY FOR ALLERGIES 1/30/20   Sadie Miranda MD   atenolol (TENORMIN) 25 mg tablet TAKE 1 TABLET BY MOUTH DAILY 1/2/20   Sadie Miranda MD    mg capsule TAKE 1 CAPSULE TWICE DAILY AS NEEDED FOR CONSTIPATION 12/31/19   Sadie Miranda MD   fluticasone propionate (FLONASE) 50 mcg/actuation nasal spray INHALE 2 SPRAYS INTO EACH NOSTRIL ONCE DAILY 9/30/19   Sadie Miranda MD   VENTOLIN HFA 90 mcg/actuation inhaler INHALE 1 OR 2 PUFFS EVERY 4 HOURS AS NEEDED FOR WHEEZING OR SHORTNESSOF BREATH. 5/1/19   Wily Cortes NP   fluticasone (FLONASE) 50 mcg/actuation nasal spray INHALE 2 SPRAYS INTO EACH NOSTRIL ONCE DAILY 6/4/18   Sadie Miranda MD   loratadine (CLARITIN) 10 mg tablet TAKE 1 TABLET BY MOUTH DAILY FOR ALLERGIES 3/5/18   Sadie Miranda MD   Nebulizer & Compressor machine For the administration of albuterol every 4 hours as needed for wheezing. Diagnosis code J45.31. 1/19/17   Wily Cortes NP   inhalational spacing device (SPACE CHAMBER PLUS) 1 Each by Does Not Apply route as needed. For use with albuterol inhaler. 1/16/17   Wily Cortes NP   albuterol (PROVENTIL VENTOLIN) 2.5 mg /3 mL (0.083 %) nebulizer solution 3 mL by Nebulization route every four (4) hours as needed for Wheezing. 1/16/17   Wily Cortes NP   divalproex DR (DEPAKOTE) 250 mg tablet  1/15/15   Provider, Historical   ondansetron hcl (ZOFRAN) 4 mg tablet Take 4 mg by mouth every six (6) hours. Provider, Historical   sennosides (SENNA) 8.6 mg cap Take 1 tablet by mouth nightly as needed. Provider, Historical   hydrocortisone (HYCORT) 1 % ointment Apply  to affected area two (2) times a day. use thin layer 11/4/14   MISTI Niño   risperiDONE (RISPERDAL) 4 mg tablet Take 2 mg by mouth daily. 3/15/14   Provider, Historical   bisacodyl (DULCOLAX, BISACODYL,) 5 mg EC tablet Take 1 Tab by mouth daily as needed for Constipation. 3/6/14   Sadie Miranda MD   dextromethorphan-guaiFENesin (ROBITUSSIN-DM)  mg/5 mL syrup Take 10 mL by mouth four (4) times daily as needed for Cough. 2/15/12   Venessa Rutledge MD   pramoxine-mineral oil-zinc (TUCKS) 1-12.5 % rectal ointment by PeriANAL route every four (4) hours as needed for Hemorrhoids, Pain and Itching. 10/26/11   Venessa Rutledge MD   lithium carbonate (ESKALITH) 300 mg capsule Take 300 mg by mouth two (2) times daily (with meals). Provider, Historical   temazepam (RESTORIL) 15 mg capsule Take  by mouth nightly as needed.     Provider, Historical   magnesium hydroxide (MILK OF MAGNESIA) 400 mg/5 mL suspension Take 30 mL by mouth daily as needed. Provider, Historical   ibuprofen (MOTRIN) 600 mg tablet Take  by mouth every six (6) hours as needed. Provider, Historical   aluminum & magnesium hydroxide-simethicone (MYLANTA MAXIMUM STRENGTH) 400-400-40 mg/5 mL suspension Take 10 mL by mouth every six (6) hours as needed. Provider, Historical   acetaminophen (TYLENOL) 325 mg tablet Take  by mouth every four (4) hours as needed. Provider, Historical     No Known Allergies  Patient Active Problem List    Diagnosis    Weakness of both legs    Need for assistance due to unsteady gait    Falls    Lives in group home    Intermittent explosive disorder    HTN (hypertension)    Intellectual disability    Allergic rhinitis due to other allergen    Unspecified asthma(493.90)    Diabetes mellitus type 2, controlled ([address]     Patient Active Problem List   Diagnosis Code    Intellectual disability F68    Allergic rhinitis due to other allergen J30.89    Unspecified asthma(493.90) J45.909    Diabetes mellitus type 2, controlled ([address] E11.9    HTN (hypertension) I10    Intermittent explosive disorder F63.81    Lives in group home Z59.3    Weakness of both legs R29.898    Need for assistance due to unsteady gait R26.89    Falls W19. XXXA       ROS    Objective:   Vital Signs: (As obtained by patient/caregiver at home)  There were no vitals taken for this visit.      [INSTRUCTIONS:  \"[x]\" Indicates a positive item  \"[]\" Indicates a negative item  -- DELETE ALL ITEMS NOT EXAMINED]    Constitutional: [x] Appears well-developed and well-nourished [x] No apparent distress      [] Abnormal -     Mental status: [x] Alert and awake  [x] Oriented to person/place/time [x] Able to follow commands    [] Abnormal -     Eyes:   EOM    [x]  Normal    [] Abnormal -   Sclera  [x]  Normal    [] Abnormal -          Discharge [x]  None visible   [] Abnormal -     HENT: [x] Normocephalic, atraumatic  [] Abnormal -   [x] Mouth/Throat: Mucous membranes are moist    External Ears [x] Normal  [] Abnormal -    Neck: [x] No visualized mass [] Abnormal -     Pulmonary/Chest: [x] Respiratory effort normal   [x] No visualized signs of difficulty breathing or respiratory distress        [] Abnormal -        Neurological:        [x] No Facial Asymmetry (Cranial nerve 7 motor function) (limited exam due to video visit)          [x] No gaze palsy        [] Abnormal -          Skin:        [x] No significant exanthematous lesions or discoloration noted on facial skin         [] Abnormal -            Psychiatric:       [x] Normal Affect [] Abnormal -        [x] No Hallucinations    Other pertinent observable physical exam findings:-              Assessment & Plan:   Diagnoses and all orders for this visit:    1. Routine general medical examination at a health care facility  -see below    2. Controlled type 2 diabetes mellitus without complication, without long-term current use of insulin ([address]  -     LIPID PANEL; Future  -     METABOLIC PANEL, COMPREHENSIVE; Future  -     CBC WITH AUTOMATED DIFF; Future  -     TSH 3RD GENERATION; Future  -     HEMOGLOBIN A1C WITH EAG; Future  -     MICROALBUMIN, UR, RAND W/ MICROALB/CREAT RATIO; Future  Lab Results   Component Value Date/Time    Hemoglobin A1c 5.9 (H) 12/12/2018 03:00 PM     -at goal    3. Essential hypertension  -     LIPID PANEL; Future  -     METABOLIC PANEL, COMPREHENSIVE; Future  -[facility] at [facility]    4. Need for assistance due to unsteady gait  -stable    5. Intellectual disability  6. Lives in group home  7. Intermittent explosive disorder  8. Bilat Knee pain:    Patient suffering from some vague knee pain. Will call in Naprosyn 500 twice daily as needed.     Pain evaluation performed   -Cognitive Screen performed  -Depression Screen, Fall risks (by up and go test)  and ADL functionality were addressed  -Medication list updated and reviewed for any changes   -A comprehensive review of medical issues and a plan was formulated  -Health Screenings for preventions were addressed and a plan was formulated  --Discussed with patient cancer risk factors and appropriate screenings for age  --Labs from previous visits were discussed with patient   -Discussed with patient diet and exercise and formulated a plan as needed  -Alcohol screening performed and was negative              We discussed the expected course, resolution and complications of the diagnosis(es) in detail. Medication risks, benefits, costs, interactions, and alternatives were discussed as indicated. I advised him to contact the office if his condition worsens, changes or fails to improve as anticipated. He expressed understanding with the diagnosis(es) and plan. Tin Piña is a 27 y.o. male being evaluated by a video visit encounter for concerns as above. A caregiver was present when appropriate. Due to this being a TeleHealth encounter ([facility]), evaluation of the following organ systems was limited: Vitals/Constitutional/EENT/Resp/CV/GI//MS/Neuro/Skin/Heme-Lymph-Imm. Pursuant to the emergency declaration under the [facility] waiver authority and the [employer] Act, this Virtual  Visit was conducted, with patient's (and/or legal guardian's) consent, to reduce the patient's risk of exposure to COVID-19 and provide necessary medical care. Services were provided through a video synchronous discussion virtually to substitute for in-person clinic visit. Patient and provider were located at their individual homes.         Darshan Rausch MD

## 2020-08-20 LAB
ALBUMIN SERPL-MCNC: 4.3 G/DL (ref 4–5)
ALBUMIN/GLOB SERPL: 1.7 {RATIO} (ref 1.2–2.2)
ALP SERPL-CCNC: 98 IU/L (ref 39–117)
ALT SERPL-CCNC: 18 IU/L (ref 0–44)
AST SERPL-CCNC: 15 IU/L (ref 0–40)
BASOPHILS # BLD AUTO: 0 X10E3/UL (ref 0–0.2)
BASOPHILS NFR BLD AUTO: 0 %
BILIRUB SERPL-MCNC: 0.5 MG/DL (ref 0–1.2)
BUN SERPL-MCNC: 9 MG/DL (ref 6–20)
BUN/CREAT SERPL: 10 (ref 9–20)
CALCIUM SERPL-MCNC: 9.4 MG/DL (ref 8.7–10.2)
CHLORIDE SERPL-SCNC: 108 MMOL/L (ref 96–106)
CHOLEST SERPL-MCNC: 132 MG/DL (ref 100–199)
CO2 SERPL-SCNC: 22 MMOL/L (ref 20–29)
CREAT SERPL-MCNC: 0.86 MG/DL (ref 0.76–1.27)
EOSINOPHIL # BLD AUTO: 0.1 X10E3/UL (ref 0–0.4)
EOSINOPHIL NFR BLD AUTO: 1 %
ERYTHROCYTE [DISTWIDTH] IN BLOOD BY AUTOMATED COUNT: 12.8 % (ref 11.6–15.4)
EST. AVERAGE GLUCOSE BLD GHB EST-MCNC: 111 MG/DL
GLOBULIN SER CALC-MCNC: 2.5 G/DL (ref 1.5–4.5)
GLUCOSE SERPL-MCNC: 109 MG/DL (ref 65–99)
HBA1C MFR BLD: 5.5 % (ref 4.8–5.6)
HCT VFR BLD AUTO: 48.8 % (ref 37.5–51)
HDLC SERPL-MCNC: 30 MG/DL
HGB BLD-MCNC: 15.6 G/DL (ref 13–17.7)
IMM GRANULOCYTES # BLD AUTO: 0 X10E3/UL (ref 0–0.1)
IMM GRANULOCYTES NFR BLD AUTO: 1 %
INTERPRETATION, 910389: NORMAL
LDLC SERPL CALC-MCNC: 77 MG/DL (ref 0–99)
LYMPHOCYTES # BLD AUTO: 1.8 X10E3/UL (ref 0.7–3.1)
LYMPHOCYTES NFR BLD AUTO: 37 %
Lab: NORMAL
MCH RBC QN AUTO: 30.9 PG (ref 26.6–33)
MCHC RBC AUTO-ENTMCNC: 32 G/DL (ref 31.5–35.7)
MCV RBC AUTO: 97 FL (ref 79–97)
MONOCYTES # BLD AUTO: 0.3 X10E3/UL (ref 0.1–0.9)
MONOCYTES NFR BLD AUTO: 5 %
NEUTROPHILS # BLD AUTO: 2.7 X10E3/UL (ref 1.4–7)
NEUTROPHILS NFR BLD AUTO: 56 %
PLATELET # BLD AUTO: 157 X10E3/UL (ref 150–450)
POTASSIUM SERPL-SCNC: 4.6 MMOL/L (ref 3.5–5.2)
PROT SERPL-MCNC: 6.8 G/DL (ref 6–8.5)
RBC # BLD AUTO: 5.05 X10E6/UL (ref 4.14–5.8)
SODIUM SERPL-SCNC: 144 MMOL/L (ref 134–144)
TRIGL SERPL-MCNC: 127 MG/DL (ref 0–149)
TSH SERPL DL<=0.005 MIU/L-ACNC: 3.2 UIU/ML (ref 0.45–4.5)
VLDLC SERPL CALC-MCNC: 25 MG/DL (ref 5–40)
WBC # BLD AUTO: 4.9 X10E3/UL (ref 3.4–10.8)

## 2020-08-24 NOTE — PROGRESS NOTES
Spoke with pt's caregiver in regards to lab results; stated she understood. Per request labs faxed to [phone],GARRETTW confirmation received.

## 2020-10-27 RX ORDER — FLUTICASONE PROPIONATE 50 MCG
SPRAY, SUSPENSION (ML) NASAL
Qty: 16 G | Refills: 0 | Status: SHIPPED | OUTPATIENT
Start: 2020-10-27 | End: 2020-12-09

## 2020-11-16 ENCOUNTER — OFFICE VISIT (OUTPATIENT)
Dept: FAMILY MEDICINE CLINIC | Age: 35
End: 2020-11-16
Payer: MEDICARE

## 2020-11-16 VITALS
RESPIRATION RATE: 18 BRPM | TEMPERATURE: 97.9 F | HEART RATE: 80 BPM | DIASTOLIC BLOOD PRESSURE: 80 MMHG | BODY MASS INDEX: 30.17 KG/M2 | OXYGEN SATURATION: 96 % | SYSTOLIC BLOOD PRESSURE: 120 MMHG | WEIGHT: 176.7 LBS | HEIGHT: 64 IN

## 2020-11-16 DIAGNOSIS — E11.9 CONTROLLED TYPE 2 DIABETES MELLITUS WITHOUT COMPLICATION, WITHOUT LONG-TERM CURRENT USE OF INSULIN (HCC): Primary | ICD-10-CM

## 2020-11-16 DIAGNOSIS — F63.81 INTERMITTENT EXPLOSIVE DISORDER: ICD-10-CM

## 2020-11-16 DIAGNOSIS — I10 ESSENTIAL HYPERTENSION: ICD-10-CM

## 2020-11-16 DIAGNOSIS — F79 INTELLECTUAL DISABILITY: ICD-10-CM

## 2020-11-16 DIAGNOSIS — Z11.1 SCREENING-PULMONARY TB: ICD-10-CM

## 2020-11-16 LAB
ALBUMIN SERPL-MCNC: 3.5 G/DL (ref 3.5–5)
ALBUMIN/GLOB SERPL: 1.1 {RATIO} (ref 1.1–2.2)
ALP SERPL-CCNC: 97 U/L (ref 45–117)
ALT SERPL-CCNC: 23 U/L (ref 12–78)
ANION GAP SERPL CALC-SCNC: 5 MMOL/L (ref 5–15)
AST SERPL-CCNC: 13 U/L (ref 15–37)
BILIRUB SERPL-MCNC: 0.4 MG/DL (ref 0.2–1)
BUN SERPL-MCNC: 14 MG/DL (ref 6–20)
BUN/CREAT SERPL: 19 (ref 12–20)
CALCIUM SERPL-MCNC: 8.9 MG/DL (ref 8.5–10.1)
CHLORIDE SERPL-SCNC: 113 MMOL/L (ref 97–108)
CHOLEST SERPL-MCNC: 122 MG/DL
CO2 SERPL-SCNC: 25 MMOL/L (ref 21–32)
CREAT SERPL-MCNC: 0.74 MG/DL (ref 0.7–1.3)
EST. AVERAGE GLUCOSE BLD GHB EST-MCNC: 114 MG/DL
GLOBULIN SER CALC-MCNC: 3.1 G/DL (ref 2–4)
GLUCOSE SERPL-MCNC: 121 MG/DL (ref 65–100)
HBA1C MFR BLD: 5.6 % (ref 4–5.6)
HDLC SERPL-MCNC: 30 MG/DL
HDLC SERPL: 4.1 {RATIO} (ref 0–5)
LDLC SERPL CALC-MCNC: 62 MG/DL (ref 0–100)
LIPID PROFILE,FLP: ABNORMAL
POTASSIUM SERPL-SCNC: 4.2 MMOL/L (ref 3.5–5.1)
PROT SERPL-MCNC: 6.6 G/DL (ref 6.4–8.2)
SODIUM SERPL-SCNC: 143 MMOL/L (ref 136–145)
TRIGL SERPL-MCNC: 150 MG/DL (ref ?–150)
VLDLC SERPL CALC-MCNC: 30 MG/DL

## 2020-11-16 PROCEDURE — 99214 OFFICE O/P EST MOD 30 MIN: CPT | Performed by: FAMILY MEDICINE

## 2020-11-16 NOTE — PROGRESS NOTES
Chief Complaint   Patient presents with    Complete Physical    Labs     NON Fasting: TB    Form Completion     1. Have you been to the ER, urgent care clinic since your last visit? Hospitalized since your last visit? No    2. Have you seen or consulted any other health care providers outside of the [address] [place] [address] since your last visit? Include any pap smears or colon screening. No      Lab Results   Component Value Date/Time    Microalb/Creat ratio (ug/mg creat.) 3.9 03/18/2015 10:21 AM      Chief Complaint   Patient presents with    Complete Physical    Labs     NON Fasting: TB    Form Completion     he is a 27y.o. year old male who presents for evaluation. See Diabetic Report Card listed above. Patient Active Problem List    Diagnosis    Weakness of both legs    Need for assistance due to unsteady gait    Falls    Lives in group home    Intermittent explosive disorder    HTN (hypertension)    Intellectual disability    Allergic rhinitis due to other allergen    Unspecified asthma(493.90)    Diabetes mellitus type 2, controlled ([facility]       Reviewed PmHx, RxHx, FmHx, SocHx, AllgHx--dated and updated in the chart. Review of Systems - negative except as listed above in the HPI    Objective:     Vitals:    11/16/20 1041   BP: 120/80   Pulse: 80   Resp: 18   Temp: 97.9 °F (36.6 °C)   TempSrc: Oral   SpO2: 96%   Weight: 176 lb 11.2 oz (80.2 kg)   Height: 5' 4\" (1.626 m)     Physical Examination: General appearance - alert, well appearing, and in no distress  Neck - supple, no significant adenopathy  Chest - clear to auscultation, no wheezes, rales or rhonchi, symmetric air entry  Heart - normal rate, regular rhythm, normal S1, S2, no murmurs, rubs, clicks or gallops  Abdomen - soft, nontender, nondistended, no masses or organomegaly      Assessment/ Plan:   Diagnoses and all orders for this visit:    1.  Controlled type 2 diabetes mellitus without complication, without long-term current use of insulin ([facility]  -     LIPID PANEL; Future  -     METABOLIC PANEL, COMPREHENSIVE; Future  -     HEMOGLOBIN A1C WITH EAG; Future    2. Essential hypertension  -     LIPID PANEL; Future  -     METABOLIC PANEL, COMPREHENSIVE; Future    3. Lives in group home    4. Intermittent explosive disorder    5. Intellectual disability    6. Screening-pulmonary TB  -     QUANTIFERON-TB GOLD PLUS; Future       Follow-up and Dispositions    · Return in about 6 months (around 5/16/2021). Lab Results   Component Value Date/Time    Cholesterol, total 132 08/19/2020 12:00 AM    HDL Cholesterol 30 (L) 08/19/2020 12:00 AM    LDL, calculated 77 08/19/2020 12:00 AM    Triglyceride 127 08/19/2020 12:00 AM     Lab Results   Component Value Date/Time    Hemoglobin A1c 5.5 08/19/2020 12:00 AM    Hemoglobin A1c 5.9 (H) 12/12/2018 03:00 PM    Hemoglobin A1c 5.9 (H) 09/11/2018 10:27 AM    Microalb/Creat ratio (ug/mg creat.) 3.9 03/18/2015 10:21 AM    LDL, calculated 77 08/19/2020 12:00 AM    Creatinine 0.86 08/19/2020 12:00 AM          Discussed with patient goal of Diabetes to include:  HgA1C <7, LDL cholesterol <100, Blood pressure <140/80. Discussed with patient diet and weight management and to get regular exercise. Recommend yearly eye exams and daily foot care. The patient understands and agrees with the plan. I have discussed the diagnosis with the patient and the intended plan as seen in the above orders. The patient has received an after-visit summary and questions were answered concerning future plans. Medication Side Effects and Warnings were discussed with patient  Patient Labs were reviewed and or requested  Patient Past Records were reviewed and or requested    Lalit Kwan M.D. [address] [facility]    There are no Patient Instructions on file for this visit.

## 2020-11-17 NOTE — PROGRESS NOTES
Thank you for your visit,  and I hope that we met your expectations! We are offering Virtual appointments  for our patients who would like a more convenient access to your provider. ..just ask the  when you   call our office for your next appointment. Coming soon is a [address] that will offer  many new exciting options for you. After reviewing your labs, I believe they are within normal  limits for your age. Keep working hard on diet and taking your medications that are prescribed. If you have any acute care needs and are having trouble getting an appointment. .. please send me a   [facility] message or have the  send me a message. Have a blessed day and  be kind  to others! If you have any questions, feel free to email thru [facility], or give us   a call back at [phone]. Stefanie Winkler M.D.   Good Help to Those in Need  \"You maybe whatever you resolve to be\"

## 2020-11-19 LAB
M TB IFN-G BLD-IMP: NEGATIVE
QUANTIFERON CRITERIA, QFI1T: NORMAL
QUANTIFERON MITOGEN VALUE: >10 IU/ML
QUANTIFERON NIL VALUE: 0.03 IU/ML
QUANTIFERON TB1 AG: 0.05 IU/ML
QUANTIFERON TB2 AG: 0.03 IU/ML

## 2020-11-27 RX ORDER — ATENOLOL 25 MG/1
TABLET ORAL
Qty: 30 TAB | Refills: 12 | Status: SHIPPED | OUTPATIENT
Start: 2020-11-27 | End: 2021-11-29

## 2020-12-09 RX ORDER — FLUTICASONE PROPIONATE 50 MCG
SPRAY, SUSPENSION (ML) NASAL
Qty: 16 G | Refills: 0 | Status: SHIPPED | OUTPATIENT
Start: 2020-12-09 | End: 2020-12-31

## 2020-12-29 RX ORDER — LORATADINE 10 MG/1
TABLET ORAL
Qty: 31 TAB | Refills: 12 | Status: SHIPPED | OUTPATIENT
Start: 2020-12-29 | End: 2021-07-21 | Stop reason: SDUPTHER

## 2020-12-31 DIAGNOSIS — M25.561 ACUTE PAIN OF BOTH KNEES: ICD-10-CM

## 2020-12-31 DIAGNOSIS — M25.562 ACUTE PAIN OF BOTH KNEES: ICD-10-CM

## 2020-12-31 RX ORDER — ALBUTEROL SULFATE 90 UG/1
AEROSOL, METERED RESPIRATORY (INHALATION)
Qty: 18 G | Refills: 12 | Status: SHIPPED | OUTPATIENT
Start: 2020-12-31

## 2020-12-31 RX ORDER — DOCUSATE SODIUM 100 MG/1
CAPSULE, LIQUID FILLED ORAL
Qty: 60 CAP | Refills: 0 | Status: SHIPPED | OUTPATIENT
Start: 2020-12-31 | End: 2022-02-10

## 2020-12-31 RX ORDER — NAPROXEN 500 MG/1
TABLET ORAL
Qty: 30 TAB | Refills: 0 | Status: SHIPPED | OUTPATIENT
Start: 2020-12-31 | End: 2021-04-28

## 2020-12-31 RX ORDER — FLUTICASONE PROPIONATE 50 MCG
SPRAY, SUSPENSION (ML) NASAL
Qty: 16 G | Refills: 12 | Status: SHIPPED | OUTPATIENT
Start: 2020-12-31 | End: 2021-07-21 | Stop reason: SDUPTHER

## 2021-01-08 ENCOUNTER — TELEPHONE (OUTPATIENT)
Dept: FAMILY MEDICINE CLINIC | Age: 36
End: 2021-01-08

## 2021-01-08 NOTE — TELEPHONE ENCOUNTER
Jovanna Roberts/Ms Miah Diallo would like a call to discuss if patient can be administered COVID vaccine.  Ms Gallardo's phone: [phone]

## 2021-01-08 NOTE — TELEPHONE ENCOUNTER
CIPRIANOM to Ms. Karli Hernandez informing her per PAIGE Bennett's information regarding Covid Vaccine

## 2021-01-08 NOTE — TELEPHONE ENCOUNTER
Please let the patient know as of now contraindications to the vaccine would include those people that are allergic to ingredients found in the vaccine and those people that have a history of severe allergic reactions requiring the use of an epi pen. Based on patient's chart he is not allergic to anything.  thanks

## 2021-01-13 ENCOUNTER — TELEPHONE (OUTPATIENT)
Dept: FAMILY MEDICINE CLINIC | Age: 36
End: 2021-01-13

## 2021-01-13 NOTE — TELEPHONE ENCOUNTER
Returned call to [employer]. Only contraindications are patients who have an allergy to component in vaccine or patients who previously had severe reactions to other vaccinations.

## 2021-01-13 NOTE — TELEPHONE ENCOUNTER
[facility] will be getting the Covid vaccine needs to know if it is ok for the pt to receive  Please advise call Eduardo Fox phone # [phone] or Fax # [phone]

## 2021-05-10 ENCOUNTER — OFFICE VISIT (OUTPATIENT)
Dept: FAMILY MEDICINE CLINIC | Age: 36
End: 2021-05-10
Payer: MEDICARE

## 2021-05-10 VITALS
HEIGHT: 64 IN | DIASTOLIC BLOOD PRESSURE: 85 MMHG | HEART RATE: 82 BPM | BODY MASS INDEX: 30.9 KG/M2 | WEIGHT: 181 LBS | OXYGEN SATURATION: 95 % | RESPIRATION RATE: 18 BRPM | SYSTOLIC BLOOD PRESSURE: 116 MMHG | TEMPERATURE: 98.7 F

## 2021-05-10 DIAGNOSIS — E11.9 CONTROLLED TYPE 2 DIABETES MELLITUS WITHOUT COMPLICATION, WITHOUT LONG-TERM CURRENT USE OF INSULIN (HCC): Primary | Chronic | ICD-10-CM

## 2021-05-10 DIAGNOSIS — F79 INTELLECTUAL DISABILITY: Chronic | ICD-10-CM

## 2021-05-10 DIAGNOSIS — I10 ESSENTIAL HYPERTENSION: ICD-10-CM

## 2021-05-10 DIAGNOSIS — M25.562 ACUTE PAIN OF BOTH KNEES: ICD-10-CM

## 2021-05-10 DIAGNOSIS — M25.561 ACUTE PAIN OF BOTH KNEES: ICD-10-CM

## 2021-05-10 PROCEDURE — 99214 OFFICE O/P EST MOD 30 MIN: CPT | Performed by: FAMILY MEDICINE

## 2021-05-10 RX ORDER — NAPROXEN 500 MG/1
500 TABLET ORAL
Qty: 30 TAB | Refills: 11 | Status: SHIPPED | OUTPATIENT
Start: 2021-05-10 | End: 2022-01-10 | Stop reason: ALTCHOICE

## 2021-05-10 NOTE — PROGRESS NOTES
Patient here for med refill. Care giver wanting name of neurology for consult for shunt. 1. Have you been to the ER, urgent care clinic since your last visit? Hospitalized since your last visit? No    2. Have you seen or consulted any other health care providers outside of the [address] [place] [address] since your last visit? Include any pap smears or colon screening. No     Lab Results   Component Value Date/Time    Microalb/Creat ratio (ug/mg creat.) 3.9 03/18/2015 10:21 AM      Chief Complaint   Patient presents with    Medication Refill    Referral Follow Up     needs neuro consult for shunt     he is a 38y.o. year old male who presents for evaluation. See Diabetic Report Card listed above. Patient Active Problem List    Diagnosis    Weakness of both legs    Need for assistance due to unsteady gait    Falls    Lives in group home    Intermittent explosive disorder    HTN (hypertension)    Intellectual disability    Allergic rhinitis due to other allergen    Unspecified asthma(493.90)    Diabetes mellitus type 2, controlled ([address]       Reviewed PmHx, RxHx, FmHx, SocHx, AllgHx--dated and updated in the chart. Review of Systems - negative except as listed above in the HPI    Objective:     Vitals:    05/10/21 1121   BP: 116/85   Pulse: 82   Resp: 18   Temp: 98.7 °F (37.1 °C)   SpO2: 95%   Weight: 181 lb (82.1 kg)   Height: 5' 4\" (1.626 m)     Physical Examination: General appearance - alert, well appearing, and in no distress    Assessment/ Plan:   Diagnoses and all orders for this visit:    1. Controlled type 2 diabetes mellitus without complication, without long-term current use of insulin ([address]  -     LIPID PANEL; Future  -     METABOLIC PANEL, COMPREHENSIVE; Future  -     HEMOGLOBIN A1C WITH EAG; Future  A1c at goal  2. Acute pain of both knees  -     naproxen (NAPROSYN) 500 mg tablet; Take 1 Tab by mouth two (2) times daily as needed for Pain. Continue current meds  3.  Essential hypertension  -     LIPID PANEL; Future  -     METABOLIC PANEL, COMPREHENSIVE; Future  Blood pressure goal  4. Intellectual disability  Refer for shunt management as well  5. Lives in group home  Group home was filled out    Lab Results   Component Value Date/Time    Cholesterol, total 122 11/16/2020 12:00 AM    HDL Cholesterol 30 11/16/2020 12:00 AM    LDL, calculated 62 11/16/2020 12:00 AM    Triglyceride 150 (H) 11/16/2020 12:00 AM    CHOL/HDL Ratio 4.1 11/16/2020 12:00 AM     Lab Results   Component Value Date/Time    Hemoglobin A1c 5.6 11/16/2020 12:00 AM    Hemoglobin A1c 5.5 08/19/2020 12:00 AM    Hemoglobin A1c 5.9 (H) 12/12/2018 03:00 PM    Microalb/Creat ratio (ug/mg creat.) 3.9 03/18/2015 10:21 AM    LDL, calculated 62 11/16/2020 12:00 AM    Creatinine 0.74 11/16/2020 12:00 AM          Discussed with patient goal of Diabetes to include:  HgA1C <7, LDL cholesterol <100, Blood pressure <140/80. Discussed with patient diet and weight management and to get regular exercise. Recommend yearly eye exams and daily foot care. The patient understands and agrees with the plan. I have discussed the diagnosis with the patient and the intended plan as seen in the above orders. The patient has received an after-visit summary and questions were answered concerning future plans. Medication Side Effects and Warnings were discussed with patient  Patient Labs were reviewed and or requested  Patient Past Records were reviewed and or requested    Alli Drake M.D. [address] [facility]    There are no Patient Instructions on file for this visit.

## 2021-05-11 LAB
ALBUMIN SERPL-MCNC: 4.4 G/DL (ref 4–5)
ALBUMIN/GLOB SERPL: 1.9 {RATIO} (ref 1.2–2.2)
ALP SERPL-CCNC: 105 IU/L (ref 39–117)
ALT SERPL-CCNC: 18 IU/L (ref 0–44)
AST SERPL-CCNC: 17 IU/L (ref 0–40)
BILIRUB SERPL-MCNC: 0.5 MG/DL (ref 0–1.2)
BUN SERPL-MCNC: 14 MG/DL (ref 6–20)
BUN/CREAT SERPL: 17 (ref 9–20)
CALCIUM SERPL-MCNC: 9.5 MG/DL (ref 8.7–10.2)
CHLORIDE SERPL-SCNC: 109 MMOL/L (ref 96–106)
CHOLEST SERPL-MCNC: 135 MG/DL (ref 100–199)
CO2 SERPL-SCNC: 21 MMOL/L (ref 20–29)
CREAT SERPL-MCNC: 0.82 MG/DL (ref 0.76–1.27)
EST. AVERAGE GLUCOSE BLD GHB EST-MCNC: 117 MG/DL
GLOBULIN SER CALC-MCNC: 2.3 G/DL (ref 1.5–4.5)
GLUCOSE SERPL-MCNC: 125 MG/DL (ref 65–99)
HBA1C MFR BLD: 5.7 % (ref 4.8–5.6)
HDLC SERPL-MCNC: 27 MG/DL
IMP & REVIEW OF LAB RESULTS: NORMAL
LDLC SERPL CALC-MCNC: 81 MG/DL (ref 0–99)
POTASSIUM SERPL-SCNC: 4.4 MMOL/L (ref 3.5–5.2)
PROT SERPL-MCNC: 6.7 G/DL (ref 6–8.5)
SODIUM SERPL-SCNC: 144 MMOL/L (ref 134–144)
TRIGL SERPL-MCNC: 155 MG/DL (ref 0–149)
VLDLC SERPL CALC-MCNC: 27 MG/DL (ref 5–40)

## 2021-05-11 NOTE — PROGRESS NOTES
Thank you for your visit,  and I hope that we met your expectations! Let us hope 2021 is a great year for you! For 2021 and beyond we are offering Virtual appointments for you, giving you more convenient access to your provider. ..just ask the  when you call our office for your next appointment. We also are offering E-Visits. You can find the link for an E-Visit in your [facility] milvia and this is a visit thru messaging and attached to your medical record. If you have a simple concern you can click on this link and answer few questions, by the end of the day your concerns will have been addressed. After reviewing your labs, they are within normal limits for your age. Keep working hard on diet and taking your medications that are prescribed. If you have any acute care needs and are having trouble getting an appointment. .. please send me a   [facility] message or have the  send me a message by calling [phone]. Have a blessed day and don't forget to be kind  to others! Rosa Cedillo M.D.   Good Help to Those in Need  \"You may be whatever you resolve to be\"

## 2021-06-17 ENCOUNTER — DOCUMENTATION ONLY (OUTPATIENT)
Dept: FAMILY MEDICINE CLINIC | Age: 36
End: 2021-06-17

## 2021-06-21 ENCOUNTER — DOCUMENTATION ONLY (OUTPATIENT)
Dept: FAMILY MEDICINE CLINIC | Age: 36
End: 2021-06-21

## 2021-06-21 NOTE — PROGRESS NOTES
[facility] CMN was signed & faxed to [phone],ok,Copy placed in scan folder to be scanned to chart.

## 2021-07-01 ENCOUNTER — VIRTUAL VISIT (OUTPATIENT)
Dept: FAMILY MEDICINE CLINIC | Age: 36
End: 2021-07-01
Payer: MEDICARE

## 2021-07-01 DIAGNOSIS — F79 INTELLECTUAL DISABILITY: ICD-10-CM

## 2021-07-01 DIAGNOSIS — I10 ESSENTIAL HYPERTENSION: ICD-10-CM

## 2021-07-01 DIAGNOSIS — K56.2 INTESTINAL VOLVULUS (HCC): Primary | ICD-10-CM

## 2021-07-01 DIAGNOSIS — E11.9 CONTROLLED TYPE 2 DIABETES MELLITUS WITHOUT COMPLICATION, WITHOUT LONG-TERM CURRENT USE OF INSULIN (HCC): ICD-10-CM

## 2021-07-01 PROCEDURE — 99214 OFFICE O/P EST MOD 30 MIN: CPT | Performed by: FAMILY MEDICINE

## 2021-07-01 NOTE — PROGRESS NOTES
Consent: Mateusz Dubose, who was seen by synchronous (real-time) audio-video technology, and/or his healthcare decision maker, is aware that this patient-initiated, Telehealth encounter on 7/1/2021 is a billable service, with coverage as determined by his insurance carrier. He is aware that he may receive a bill and has provided verbal consent to proceed: YES-Consent obtained within past 12 months  712    Prior to Admission medications    Medication Sig Start Date End Date Taking? Authorizing Provider   naproxen (NAPROSYN) 500 mg tablet Take 1 Tab by mouth two (2) times daily as needed for Pain. 5/10/21   Radha Hawthorne MD    mg capsule TAKE 1 CAPSULE TWICE DAILY AS NEEDED FOR CONSTIPATION 12/31/20   Radha Hawthorne MD   Ventolin HFA 90 mcg/actuation inhaler INHALE 1 OR 2 PUFFS EVERY 4 HOURS AS NEEDED FOR WHEEZING OR SHORTNESSOF BREATH. 12/31/20   Radha Hawthorne MD   fluticasone propionate (FLONASE) 50 mcg/actuation nasal spray INHALE 2 SPRAYS INTO EACH NOSTRIL ONCE DAILY 12/31/20   Radha Hawthorne MD   loratadine (CLARITIN) 10 mg tablet TAKE 1 TABLET BY MOUTH DAILY FOR ALLERGIES 12/29/20   Radha Hawthorne MD   atenoloL (TENORMIN) 25 mg tablet TAKE 1 TABLET BY MOUTH DAILY 11/27/20   Radha Hawthorne MD   fluticasone [facility]) 50 mcg/actuation nasal spray INHALE 2 SPRAYS INTO EACH NOSTRIL ONCE DAILY 6/4/18   Radha Hawthorne MD   loratadine (CLARITIN) 10 mg tablet TAKE 1 TABLET BY MOUTH DAILY FOR ALLERGIES 3/5/18   Radha Hawthorne MD   Nebulizer & Compressor machine For the administration of albuterol every 4 hours as needed for wheezing. Diagnosis code J45.31. 1/19/17   Noam Peter NP   inhalational spacing device (SPACE CHAMBER PLUS) 1 Each by Does Not Apply route as needed. For use with albuterol inhaler. 1/16/17   Noam Peter NP   albuterol (PROVENTIL VENTOLIN) 2.5 mg /3 mL (0.083 %) nebulizer solution 3 mL by Nebulization route every four (4) hours as needed for Wheezing.  1/16/17 Itzel Marc, RICARDO   divalproex DR (DEPAKOTE) 250 mg tablet Take 500 mg by mouth nightly. 1/15/15   Provider, Historical   ondansetron hcl (ZOFRAN) 4 mg tablet Take 4 mg by mouth every six (6) hours. Provider, Historical   sennosides (SENNA) 8.6 mg cap Take 1 tablet by mouth nightly as needed. Provider, Historical   hydrocortisone (HYCORT) 1 % ointment Apply  to affected area two (2) times a day. use thin layer 11/4/14   MISTI Luna   risperiDONE (RISPERDAL) 4 mg tablet Take 2 mg by mouth two (2) times a day. 3/15/14   Provider, Historical   bisacodyl (DULCOLAX, BISACODYL,) 5 mg EC tablet Take 1 Tab by mouth daily as needed for Constipation. 3/6/14   Shirley Mcdonnell MD   dextromethorphan-guaiFENesin (ROBITUSSIN-DM)  mg/5 mL syrup Take 10 mL by mouth four (4) times daily as needed for Cough. 2/15/12   Maya Roy MD   pramoxine-mineral oil-zinc (TUCKS) 1-12.5 % rectal ointment by PeriANAL route every four (4) hours as needed for Hemorrhoids, Pain and Itching. 10/26/11   Maya Roy MD   lithium carbonate (ESKALITH) 300 mg capsule Take 300 mg by mouth two (2) times daily (with meals). Provider, Historical   temazepam (RESTORIL) 15 mg capsule Take  by mouth nightly as needed. Provider, Historical   magnesium hydroxide (MILK OF MAGNESIA) 400 mg/5 mL suspension Take 30 mL by mouth daily as needed. Provider, Historical   ibuprofen (MOTRIN) 600 mg tablet Take  by mouth every six (6) hours as needed. Provider, Historical   aluminum & magnesium hydroxide-simethicone (MYLANTA MAXIMUM STRENGTH) 400-400-40 mg/5 mL suspension Take 10 mL by mouth every six (6) hours as needed. Provider, Historical   acetaminophen (TYLENOL) 325 mg tablet Take  by mouth every four (4) hours as needed. Provider, Historical     No Known Allergies        Assessment & Plan:   Diagnoses and all orders for this visit:    1.  Intestinal volvulus [facility])  Patient here today status post hospital discharge. He was admitted for intestinal obstruction and in the hospital for 2 weeks. He was treated medically and without surgery. He is now back at group home and doing much better. 2. Controlled type 2 diabetes mellitus without complication, without long-term current use of insulin (HCC)  Sugars have been controlled     3. Essential hypertension  Blood pressure good at group home  4. Lives in group home  5. Intellectual disability  No changes      Medication Side Effects and Warnings were discussed with patient  Patient Labs were reviewed and or requested:  Patient Past Records were reviewed and or requested              We discussed the expected course, resolution and complications of the diagnosis(es) in detail. Medication risks, benefits, costs, interactions, and alternatives were discussed as indicated. I advised him to contact the office if his condition worsens, changes or fails to improve as anticipated. He expressed understanding with the diagnosis(es) and plan. Gurinder Lora is a 38 y.o. male being evaluated by a video visit encounter for concerns as above. A caregiver was present when appropriate. Due to this being a TeleHealth encounter (During [place] [address] public health emergency), evaluation of the following organ systems was limited: Vitals/Constitutional/EENT/Resp/CV/GI//MS/Neuro/Skin/Heme-Lymph-Imm. Pursuant to the emergency declaration under the [facility], [facility]5 waiver authority and the [employer] Act, this Virtual  Visit was conducted, with patient's (and/or legal guardian's) consent, to reduce the patient's risk of exposure to COVID-19 and provide necessary medical care. Services were provided through a video synchronous discussion virtually to substitute for in-person clinic visit. Patient and provider were located at their individual homes.     I have discussed the diagnosis with the patient and the intended plan as seen in the above orders. The patient understands and agrees with the plan. The patient has received an after-visit summary and questions were answered concerning future plans. Medication Side Effects and Warnings were discussed with patient  Patient Labs were reviewed and or requested:  Patient Past Records were reviewed and or requested    Coby Del Rio M.D. There are no Patient Instructions on file for this visit.

## 2021-07-21 ENCOUNTER — OFFICE VISIT (OUTPATIENT)
Dept: FAMILY MEDICINE CLINIC | Age: 36
End: 2021-07-21
Payer: MEDICARE

## 2021-07-21 VITALS
OXYGEN SATURATION: 96 % | RESPIRATION RATE: 20 BRPM | HEIGHT: 64 IN | HEART RATE: 86 BPM | TEMPERATURE: 98.7 F | WEIGHT: 183 LBS | DIASTOLIC BLOOD PRESSURE: 79 MMHG | SYSTOLIC BLOOD PRESSURE: 126 MMHG | BODY MASS INDEX: 31.24 KG/M2

## 2021-07-21 DIAGNOSIS — I10 ESSENTIAL HYPERTENSION: ICD-10-CM

## 2021-07-21 DIAGNOSIS — K56.2 INTESTINAL VOLVULUS (HCC): Primary | ICD-10-CM

## 2021-07-21 DIAGNOSIS — F79 INTELLECTUAL DISABILITY: ICD-10-CM

## 2021-07-21 PROCEDURE — 99213 OFFICE O/P EST LOW 20 MIN: CPT | Performed by: FAMILY MEDICINE

## 2021-07-21 NOTE — PROGRESS NOTES
Patient here for hosp f/u sm. Bowel obstruction. Patient having regular bm's since discharge from hospital.    1. Have you been to the ER, urgent care clinic since your last visit? Hospitalized since your last visit? No    2. Have you seen or consulted any other health care providers outside of the [address] [place] [address] since your last visit? Include any pap smears or colon screening. No              Chief Complaint   Patient presents with   [facility] Follow Up     [facility] and more recent date     He is a 38 y.o. male who presents for evalution. Reviewed PmHx, RxHx, FmHx, SocHx, AllgHx and updated and dated in the chart. Patient Active Problem List    Diagnosis    Intestinal volvulus (HCC)    Weakness of both legs    Need for assistance due to unsteady gait    Falls    Lives in group home    Intermittent explosive disorder    HTN (hypertension)    Intellectual disability    Allergic rhinitis due to other allergen    Unspecified asthma(493.90)    [facility]       Review of Systems - negative except as listed above in the HPI    Objective:     Vitals:    07/21/21 1053   BP: 126/79   Pulse: 86   Resp: 20   Temp: 98.7 °F (37.1 °C)   SpO2: 96%   Weight: 183 lb (83 kg)   Height: 5' 4\" (1.626 m)         Assessment/ Plan:   Diagnoses and all orders for this visit:    1. Intestinal volvulus (HCC)  -back to baseline    2. Essential hypertension  -at goal    3. Intellectual disability             I have discussed the diagnosis with the patient and the intended plan as seen in the above orders. The patient understands and agrees with the plan. The patient has received an after-visit summary and questions were answered concerning future plans. Medication Side Effects and Warnings were discussed with patient  Patient Labs were reviewed and or requested:  Patient Past Records were reviewed and or requested    Olive Latif M.D.     There are no Patient Instructions on file for this visit.

## 2021-07-28 RX ORDER — FLUTICASONE PROPIONATE 50 MCG
SPRAY, SUSPENSION (ML) NASAL
Qty: 16 G | Refills: 11 | Status: SHIPPED | OUTPATIENT
Start: 2021-07-28

## 2021-08-03 ENCOUNTER — TELEPHONE (OUTPATIENT)
Dept: FAMILY MEDICINE CLINIC | Age: 36
End: 2021-08-03

## 2021-08-03 NOTE — TELEPHONE ENCOUNTER
Called and spoke to [address]. Pharmacist states understanding per Dr Negrete Few: We have not sent a dc order for either RX.

## 2021-08-03 NOTE — TELEPHONE ENCOUNTER
[address] [facility]- 07.21.21 orders to cancel two scripts/loratadine and Flonase. Pharmacist would like to know if that is correct.  Please call: [phone]

## 2021-11-29 RX ORDER — ATENOLOL 25 MG/1
TABLET ORAL
Qty: 30 TABLET | Refills: 12 | Status: SHIPPED | OUTPATIENT
Start: 2021-11-29

## 2021-12-28 RX ORDER — LORATADINE 10 MG/1
TABLET ORAL
Qty: 31 TABLET | Refills: 11 | Status: SHIPPED | OUTPATIENT
Start: 2021-12-28

## 2022-01-10 ENCOUNTER — VIRTUAL VISIT (OUTPATIENT)
Dept: FAMILY MEDICINE CLINIC | Age: 37
End: 2022-01-10
Payer: MEDICAID

## 2022-01-10 DIAGNOSIS — G89.29 CHRONIC PAIN OF BOTH KNEES: ICD-10-CM

## 2022-01-10 DIAGNOSIS — R10.13 EPIGASTRIC PAIN: Primary | ICD-10-CM

## 2022-01-10 DIAGNOSIS — M25.561 CHRONIC PAIN OF BOTH KNEES: ICD-10-CM

## 2022-01-10 DIAGNOSIS — R19.5 LOOSE STOOLS: ICD-10-CM

## 2022-01-10 DIAGNOSIS — K80.20 GALLSTONES: ICD-10-CM

## 2022-01-10 DIAGNOSIS — M25.562 CHRONIC PAIN OF BOTH KNEES: ICD-10-CM

## 2022-01-10 DIAGNOSIS — F79 INTELLECTUAL DISABILITY: ICD-10-CM

## 2022-01-10 PROCEDURE — 99214 OFFICE O/P EST MOD 30 MIN: CPT | Performed by: NURSE PRACTITIONER

## 2022-01-10 PROCEDURE — G0463 HOSPITAL OUTPT CLINIC VISIT: HCPCS | Performed by: NURSE PRACTITIONER

## 2022-01-10 PROCEDURE — G8427 DOCREV CUR MEDS BY ELIG CLIN: HCPCS | Performed by: NURSE PRACTITIONER

## 2022-01-10 PROCEDURE — G8432 DEP SCR NOT DOC, RNG: HCPCS | Performed by: NURSE PRACTITIONER

## 2022-01-10 PROCEDURE — G8756 NO BP MEASURE DOC: HCPCS | Performed by: NURSE PRACTITIONER

## 2022-01-10 RX ORDER — ACETAMINOPHEN 500 MG
500 TABLET ORAL
Qty: 60 TABLET | Refills: 1 | Status: SHIPPED | OUTPATIENT
Start: 2022-01-10

## 2022-01-10 RX ORDER — IBUPROFEN 400 MG/1
400 TABLET ORAL
Qty: 60 TABLET | Refills: 1 | Status: SHIPPED | OUTPATIENT
Start: 2022-01-10

## 2022-01-10 RX ORDER — LOPERAMIDE HCL 2 MG
2 TABLET ORAL
Qty: 30 TABLET | Refills: 1 | Status: SHIPPED | OUTPATIENT
Start: 2022-01-10

## 2022-01-10 NOTE — PROGRESS NOTES
Riana Farley is a 39 y.o. male who was seen by synchronous (real-time) audio-video technology on 1/10/2022 for Follow-up ([facility] ED)        Assessment & Plan:   Diagnoses and all orders for this visit:    1. Epigastric pain  2. Gallstones  Obtain records from ED visit and imaging  Intellectual disability presents a challenge for determining if gall bladder disease is source of symptoms and severity of symptoms  Refer to gen surgery for further eval  -     REFERRAL TO GENERAL SURGERY    3. Chronic pain of both knees  Add rx  Intellectual disability presents a challenge in assessing severity of symptoms- patient reported being unable to walk this weekend, today ambulating without difficulty  Refer for further eval  -     ibuprofen (MOTRIN) 400 mg tablet; Take 1 Tablet by mouth every six (6) hours as needed for Pain. -     acetaminophen (TYLENOL) 500 mg tablet; Take 1 Tablet by mouth every six (6) hours as needed for Pain.  -     REFERRAL TO ORTHOPEDICS    4. Intellectual disability  Stable    5. Loose stools  Add imodium prn      Follow-up and Dispositions    · Return if symptoms worsen or fail to improve. I have discussed the diagnosis with the patient and the intended plan as seen in the above orders, and questions were answered concerning future plans. Patient conveyed understanding of the plan at the time of the visit. 712  Subjective:     HPI:    Presents for ED follow up, evaluation of abdominal pain and knee pain. Seen with caregiver Jose Antonio. History obtained from caregiver. She was not present at the ED visit. Caregiver reports patient c/o severe abdominal pain and bilateral knee pain, was taken to ED at [facility] for further eval over the weekend. Caregiver states ED report indicates gallstones were noted on CT of abdomen in ED. They have also noted frequent loose stools. No fever or chills. No change in appetite. No weight loss.    Patient reported being unable to walk due to severe knee pain over the weekend. Unclear if any imaging was done in ED. Caregiver notes patient discharge instructions recommended alternating tylenol and ibuprofen over the counter for pain. They have been unable to do this since there was not a medication order written. Caregiver reports patient is walking without difficulty today. No swelling or redness noted of knees. Caregiver is not aware of any fall or other injury to the affected area. Prior to Admission medications    Medication Sig Start Date End Date Taking? Authorizing Provider   ibuprofen (MOTRIN) 400 mg tablet Take 1 Tablet by mouth every six (6) hours as needed for Pain. 1/10/22  Yes Josesito Guajardo NP   acetaminophen (TYLENOL) 500 mg tablet Take 1 Tablet by mouth every six (6) hours as needed for Pain. 1/10/22  Yes Josesito Guajardo NP   loratadine (CLARITIN) 10 mg tablet TAKE 1 TABLET BY MOUTH DAILY FOR ALLERGIES 12/28/21  Yes Tessy Goodwin MD   atenoloL (TENORMIN) 25 mg tablet TAKE 1 TABLET BY MOUTH DAILY 11/29/21  Yes Thelma Parker MD   fluticasone propionate (FLONASE) 50 mcg/actuation nasal spray INHALE 2 SPRAYS INTO EACH NOSTRIL ONCE DAILY 7/28/21  Yes Thelma Parker MD    mg capsule TAKE 1 CAPSULE TWICE DAILY AS NEEDED FOR CONSTIPATION 12/31/20  Yes Thelma Parker MD   Ventolin HFA 90 mcg/actuation inhaler INHALE 1 OR 2 PUFFS EVERY 4 HOURS AS NEEDED FOR WHEEZING OR SHORTNESSOF BREATH. 12/31/20  Yes Thelma Parker MD   Nebulizer & Compressor machine For the administration of albuterol every 4 hours as needed for wheezing. Diagnosis code J45.31. 1/19/17  Yes Jerri Pearson NP   inhalational spacing device (SPACE CHAMBER PLUS) 1 Each by Does Not Apply route as needed. For use with albuterol inhaler. 1/16/17  Yes Jerri Pearson NP   albuterol (PROVENTIL VENTOLIN) 2.5 mg /3 mL (0.083 %) nebulizer solution 3 mL by Nebulization route every four (4) hours as needed for Wheezing.  1/16/17  Yes Jerri Pearson NP divalproex DR (DEPAKOTE) 250 mg tablet Take 500 mg by mouth nightly. 1/15/15  Yes Provider, Historical   ondansetron hcl (ZOFRAN) 4 mg tablet Take 4 mg by mouth every six (6) hours. Yes Provider, Historical   sennosides (SENNA) 8.6 mg cap Take 1 tablet by mouth nightly as needed. Yes Provider, Historical   hydrocortisone (HYCORT) 1 % ointment Apply  to affected area two (2) times a day. use thin layer 11/4/14  Yes Hoffman, Bergeron Primrose, PA   risperiDONE (RISPERDAL) 4 mg tablet Take 2 mg by mouth two (2) times a day. 3/15/14  Yes Provider, Historical   bisacodyl (DULCOLAX, BISACODYL,) 5 mg EC tablet Take 1 Tab by mouth daily as needed for Constipation. 3/6/14  Yes Celia Matson MD   dextromethorphan-guaiFENesin (ROBITUSSIN-DM)  mg/5 mL syrup Take 10 mL by mouth four (4) times daily as needed for Cough. 2/15/12  Yes Cordell Castaneda MD   pramoxine-mineral oil-zinc (TUCKS) 1-12.5 % rectal ointment by PeriANAL route every four (4) hours as needed for Hemorrhoids, Pain and Itching. 10/26/11  Yes Cordell Castaneda MD   lithium carbonate (ESKALITH) 300 mg capsule Take 300 mg by mouth two (2) times daily (with meals). Yes Provider, Historical   temazepam (RESTORIL) 15 mg capsule Take  by mouth nightly as needed. Yes Provider, Historical   magnesium hydroxide (MILK OF MAGNESIA) 400 mg/5 mL suspension Take 30 mL by mouth daily as needed. Yes Provider, Historical   aluminum & magnesium hydroxide-simethicone (MYLANTA MAXIMUM STRENGTH) 400-400-40 mg/5 mL suspension Take 10 mL by mouth every six (6) hours as needed. Yes Provider, Historical   naproxen (NAPROSYN) 500 mg tablet Take 1 Tab by mouth two (2) times daily as needed for Pain. 5/10/21 1/10/22  Celia Matson MD   ibuprofen (MOTRIN) 600 mg tablet Take  by mouth every six (6) hours as needed. 1/10/22  Provider, Historical   acetaminophen (TYLENOL) 325 mg tablet Take  by mouth every four (4) hours as needed.   1/10/22  Provider, Historical     Patient Active Problem List   Diagnosis Code    Intellectual disability F68    Allergic rhinitis due to other allergen J30.89    Unspecified asthma(493.90) J45.909    Diabetes mellitus type 2, controlled ([facility] E11.9    HTN (hypertension) I10    Intermittent explosive disorder F63.81    Lives in group home Z59.3    Weakness of both legs R29.898    Need for assistance due to unsteady gait R26.89    Falls W19. Lawrence Sandhoff    Intestinal volvulus ([facility] K56.2     No Known Allergies  Past Medical History:   Diagnosis Date    Allergic rhinitis due to other allergen 3/24/2010    MR (mental retardation) 3/24/2010    Type II or unspecified type diabetes mellitus without mention of complication, not stated as uncontrolled 3/24/2010    Unspecified asthma(493.90) 3/24/2010     History reviewed. No pertinent surgical history. Family History   Family history unknown: Yes     Social History     Tobacco Use    Smoking status: Never Smoker    Smokeless tobacco: Never Used   Substance Use Topics    Alcohol use: No       Review of Systems   Constitutional: Negative for chills, fever, malaise/fatigue and weight loss. HENT: Negative. Eyes: Negative. Respiratory: Negative. Cardiovascular: Negative. Gastrointestinal: Positive for abdominal pain and diarrhea. Negative for blood in stool, constipation, heartburn, melena, nausea and vomiting. Genitourinary: Negative. Musculoskeletal: Positive for joint pain. Negative for falls. Skin: Negative. Neurological: Negative. Endo/Heme/Allergies: Negative. Psychiatric/Behavioral: Negative. Objective:   No flowsheet data found.    General: alert, cooperative, no distress   Mental  status: normal mood, behavior, speech, dress, motor activity, and thought processes, able to follow commands   HENT: NCAT   Neck: no visualized mass   Resp: no respiratory distress   Neuro: no gross deficits   Skin: no discoloration or lesions of concern on visible areas   Psychiatric: normal affect, consistent with stated mood, no evidence of hallucinations     Additional exam findings:   none    We discussed the expected course, resolution and complications of the diagnosis(es) in detail. Medication risks, benefits, costs, interactions, and alternatives were discussed as indicated. I advised him to contact the office if his condition worsens, changes or fails to improve as anticipated. He expressed understanding with the diagnosis(es) and plan. Cherise Navarrete, was evaluated through a synchronous (real-time) audio-video encounter. The patient (or guardian if applicable) is aware that this is a billable service. Verbal consent to proceed has been obtained within the past 12 months. The visit was conducted pursuant to the emergency declaration under the [address] [place] [address] authority and the [employer] General Act. Patient identification was verified, and a caregiver was present when appropriate. The patient was located in a state where the provider was credentialed to provide care.     Carrie Grey NP  1/10/2022

## 2022-01-27 ENCOUNTER — VIRTUAL VISIT (OUTPATIENT)
Dept: FAMILY MEDICINE CLINIC | Age: 37
End: 2022-01-27
Payer: MEDICARE

## 2022-01-27 DIAGNOSIS — E11.9 CONTROLLED TYPE 2 DIABETES MELLITUS WITHOUT COMPLICATION, WITHOUT LONG-TERM CURRENT USE OF INSULIN (HCC): ICD-10-CM

## 2022-01-27 DIAGNOSIS — K80.20 GALLSTONES: ICD-10-CM

## 2022-01-27 DIAGNOSIS — K62.5 BRBPR (BRIGHT RED BLOOD PER RECTUM): Primary | ICD-10-CM

## 2022-01-27 DIAGNOSIS — I10 PRIMARY HYPERTENSION: ICD-10-CM

## 2022-01-27 PROCEDURE — 99214 OFFICE O/P EST MOD 30 MIN: CPT | Performed by: FAMILY MEDICINE

## 2022-01-27 PROCEDURE — G0463 HOSPITAL OUTPT CLINIC VISIT: HCPCS | Performed by: FAMILY MEDICINE

## 2022-01-27 PROCEDURE — 2022F DILAT RTA XM EVC RTNOPTHY: CPT | Performed by: FAMILY MEDICINE

## 2022-01-27 PROCEDURE — 3046F HEMOGLOBIN A1C LEVEL >9.0%: CPT | Performed by: FAMILY MEDICINE

## 2022-01-27 PROCEDURE — G8756 NO BP MEASURE DOC: HCPCS | Performed by: FAMILY MEDICINE

## 2022-01-27 PROCEDURE — G8427 DOCREV CUR MEDS BY ELIG CLIN: HCPCS | Performed by: FAMILY MEDICINE

## 2022-01-27 PROCEDURE — G8510 SCR DEP NEG, NO PLAN REQD: HCPCS | Performed by: FAMILY MEDICINE

## 2022-01-27 NOTE — PROGRESS NOTES
Consent: Ronel Greene, who was seen by synchronous (real-time) audio-video technology, and/or his healthcare decision maker, is aware that this patient-initiated, Telehealth encounter on 1/27/2022 is a billable service, with coverage as determined by his insurance carrier. He is aware that he may receive a bill and has provided verbal consent to proceed: YES-Consent obtained within past 12 months  712    Prior to Admission medications    Medication Sig Start Date End Date Taking? Authorizing Provider   ibuprofen (MOTRIN) 400 mg tablet Take 1 Tablet by mouth every six (6) hours as needed for Pain. 1/10/22   Beth Pandya NP   acetaminophen (TYLENOL) 500 mg tablet Take 1 Tablet by mouth every six (6) hours as needed for Pain. 1/10/22   Beth Pandya NP   loperamide (IMMODIUM) 2 mg tablet Take 1 Tablet by mouth four (4) times daily as needed for Diarrhea. 1/10/22   Beth Pandya NP   loratadine (CLARITIN) 10 mg tablet TAKE 1 TABLET BY MOUTH DAILY FOR ALLERGIES 12/28/21   Manpreet Hopper MD   atenoloL (TENORMIN) 25 mg tablet TAKE 1 TABLET BY MOUTH DAILY 11/29/21   Tavo Anderson MD   fluticasone propionate (FLONASE) 50 mcg/actuation nasal spray INHALE 2 SPRAYS INTO EACH NOSTRIL ONCE DAILY 7/28/21   Tavo Anderson MD    mg capsule TAKE 1 CAPSULE TWICE DAILY AS NEEDED FOR CONSTIPATION 12/31/20   Tavo Anderson MD   Ventolin HFA 90 mcg/actuation inhaler INHALE 1 OR 2 PUFFS EVERY 4 HOURS AS NEEDED FOR WHEEZING OR SHORTNESSOF BREATH. 12/31/20   Tavo Anderson MD   Nebulizer & Compressor machine For the administration of albuterol every 4 hours as needed for wheezing. Diagnosis code J45.31. 1/19/17   Jadiel Gold, NP   inhalational spacing device (SPACE CHAMBER PLUS) 1 Each by Does Not Apply route as needed. For use with albuterol inhaler.  1/16/17   Jadiel Goncalves NP   albuterol (PROVENTIL VENTOLIN) 2.5 mg /3 mL (0.083 %) nebulizer solution 3 mL by Nebulization route every four (4) hours as needed for Wheezing. 1/16/17   Pamela Kramer NP   divalproex DR (DEPAKOTE) 250 mg tablet Take 500 mg by mouth nightly. 1/15/15   Provider, Historical   ondansetron hcl (ZOFRAN) 4 mg tablet Take 4 mg by mouth every six (6) hours. Provider, Historical   sennosides (SENNA) 8.6 mg cap Take 1 tablet by mouth nightly as needed. Provider, Historical   hydrocortisone (HYCORT) 1 % ointment Apply  to affected area two (2) times a day. use thin layer 11/4/14   MISTI Yuan   risperiDONE (RISPERDAL) 4 mg tablet Take 2 mg by mouth two (2) times a day. 3/15/14   Provider, Historical   bisacodyl (DULCOLAX, BISACODYL,) 5 mg EC tablet Take 1 Tab by mouth daily as needed for Constipation. 3/6/14   Carl Campbell MD   dextromethorphan-guaiFENesin (ROBITUSSIN-DM)  mg/5 mL syrup Take 10 mL by mouth four (4) times daily as needed for Cough. 2/15/12   Johnny Lindsey MD   pramoxine-mineral oil-zinc (TUCKS) 1-12.5 % rectal ointment by PeriANAL route every four (4) hours as needed for Hemorrhoids, Pain and Itching. 10/26/11   Johnny Lindsey MD   lithium carbonate (ESKALITH) 300 mg capsule Take 300 mg by mouth two (2) times daily (with meals). Provider, Historical   temazepam (RESTORIL) 15 mg capsule Take  by mouth nightly as needed. Provider, Historical   magnesium hydroxide (MILK OF MAGNESIA) 400 mg/5 mL suspension Take 30 mL by mouth daily as needed. Provider, Historical   aluminum & magnesium hydroxide-simethicone (MYLANTA MAXIMUM STRENGTH) 400-400-40 mg/5 mL suspension Take 10 mL by mouth every six (6) hours as needed. Provider, Historical     No Known Allergies        Assessment & Plan:   Diagnoses and all orders for this visit:    1. BRBPR (bright red blood per rectum)  Symptoms have resolved but after being taken to the ER determined he had gallstones and the need referral to see a surgeon.   2. Controlled type 2 diabetes mellitus without complication, without long-term current use of insulin ([facility]  Labs at repeat follow-up  3. Primary hypertension  Blood pressure good at group home  4. Gallstones  -     REFERRAL TO GENERAL SURGERY        Medication Side Effects and Warnings were discussed with patient  Patient Labs were reviewed and or requested:  Patient Past Records were reviewed and or requested              We discussed the expected course, resolution and complications of the diagnosis(es) in detail. Medication risks, benefits, costs, interactions, and alternatives were discussed as indicated. I advised him to contact the office if his condition worsens, changes or fails to improve as anticipated. He expressed understanding with the diagnosis(es) and plan. Sanjiv Pinon, was evaluated through a synchronous (real-time) audio-video encounter. The patient (or guardian if applicable) is aware that this is a billable service, which includes applicable co-pays. This Virtual Visit was conducted with patient's (and/or legal guardian's) consent. The visit was conducted pursuant to the emergency declaration under the [address] [place] [address] authority and the [employer] General Act. Patient identification was verified, and a caregiver was present when appropriate. The patient was located in a state where the provider was licensed to provide care. Services were provided through a video synchronous discussion virtually to substitute for in-person clinic visit. Patient and provider were located at their individual homes. I have discussed the diagnosis with the patient and the intended plan as seen in the above orders. The patient understands and agrees with the plan. The patient has received an after-visit summary and questions were answered concerning future plans.      Medication Side Effects and Warnings were discussed with patient  Patient Labs were reviewed and or requested:  Patient Past Records were reviewed and or requested    Mayra Valle M.D. There are no Patient Instructions on file for this visit.

## 2022-01-28 ENCOUNTER — TELEPHONE (OUTPATIENT)
Dept: SURGERY | Age: 37
End: 2022-01-28

## 2022-01-28 NOTE — TELEPHONE ENCOUNTER
Called PT, Unable to leave  as mailbox was full. Emergency contact is the same number.     Referred BY:LULU HARPER  Referred TO: DR. Susu Phillips  FOR: GALLSTONES    CALL #1

## 2022-02-01 ENCOUNTER — TELEPHONE (OUTPATIENT)
Dept: SURGERY | Age: 37
End: 2022-02-01

## 2022-02-01 NOTE — TELEPHONE ENCOUNTER
Called and spoke with a representative at [facility], PT was in the background. PT was able to verify  and I gave a friendly reminder of his upcoming appointment, COVID questions were asked, PT is aware of location as well and to arrive 20 minutes early to complete paperwork.

## 2022-02-02 ENCOUNTER — OFFICE VISIT (OUTPATIENT)
Dept: SURGERY | Age: 37
End: 2022-02-02
Payer: MEDICARE

## 2022-02-02 VITALS
HEIGHT: 64 IN | RESPIRATION RATE: 18 BRPM | DIASTOLIC BLOOD PRESSURE: 89 MMHG | OXYGEN SATURATION: 98 % | SYSTOLIC BLOOD PRESSURE: 112 MMHG | WEIGHT: 189.5 LBS | BODY MASS INDEX: 32.35 KG/M2 | HEART RATE: 89 BPM | TEMPERATURE: 98.5 F

## 2022-02-02 DIAGNOSIS — K80.20 SYMPTOMATIC CHOLELITHIASIS: Primary | ICD-10-CM

## 2022-02-02 PROBLEM — E66.9 CLASS 1 OBESITY IN ADULT: Status: ACTIVE | Noted: 2022-02-02

## 2022-02-02 PROCEDURE — 99204 OFFICE O/P NEW MOD 45 MIN: CPT | Performed by: SURGERY

## 2022-02-02 RX ORDER — NAPROXEN 500 MG/1
500 TABLET ORAL
COMMUNITY

## 2022-02-02 NOTE — PROGRESS NOTES
1. Have you been to the ER, urgent care clinic since your last visit? Hospitalized since your last visit? No    2. Have you seen or consulted any other health care providers outside of the [address] [place] [address] since your last visit? Include any pap smears or colon screening.  No

## 2022-02-02 NOTE — PROGRESS NOTES
Surgery History and Physical    Subjective:      Milton Joseph is a 39 y.o. white male who presents for evaluation of epigastric pain and gallstones. According to his caregiver, Mr. Christa Worthy started experiencing epigastric pain last month. The pain is worse after eating. There is no associated n/v, fever, diarrhea, or jaundice. He has no known h/o PUD, pancreatitis, liver disease, or other intestinal disease. His only known abdominal procedure is a  shunt. He was in the ER last month and was diagnosed with gallstones, but none of the information is available at the time of the consultation. Past Medical History:   Diagnosis Date    Allergic rhinitis due to other allergen 3/24/2010    Asthma     MR (mental retardation) 3/24/2010    Obesity (BMI 30.0-34. 9)     Type II or unspecified type diabetes mellitus without mention of complication, not stated as uncontrolled 3/24/2010     Past Surgical History:   Procedure Laterality Date    HX CSF SHUNT       shunt (Left side). Family History   Family history unknown: Yes     Social History     Tobacco Use    Smoking status: Never Smoker    Smokeless tobacco: Never Used   Substance Use Topics    Alcohol use: No      Prior to Admission medications    Medication Sig Start Date End Date Taking? Authorizing Provider   naproxen (NAPROSYN) 500 mg tablet Take 500 mg by mouth two (2) times daily as needed for Pain. Yes Provider, Historical   ibuprofen (MOTRIN) 400 mg tablet Take 1 Tablet by mouth every six (6) hours as needed for Pain. 1/10/22  Yes Josesito Guajardo NP   acetaminophen (TYLENOL) 500 mg tablet Take 1 Tablet by mouth every six (6) hours as needed for Pain. 1/10/22  Yes Josesito Guajardo NP   loperamide (IMMODIUM) 2 mg tablet Take 1 Tablet by mouth four (4) times daily as needed for Diarrhea.  1/10/22  Yes Josesito Guajardo NP   loratadine (CLARITIN) 10 mg tablet TAKE 1 TABLET BY MOUTH DAILY FOR ALLERGIES 12/28/21  Yes Jc Bird MD atenoloL (TENORMIN) 25 mg tablet TAKE 1 TABLET BY MOUTH DAILY 11/29/21  Yes Abi Barron MD   fluticasone propionate (FLONASE) 50 mcg/actuation nasal spray INHALE 2 SPRAYS INTO EACH NOSTRIL ONCE DAILY 7/28/21  Yes Abi Barron MD    mg capsule TAKE 1 CAPSULE TWICE DAILY AS NEEDED FOR CONSTIPATION 12/31/20  Yes Abi Barron MD   Ventolin HFA 90 mcg/actuation inhaler INHALE 1 OR 2 PUFFS EVERY 4 HOURS AS NEEDED FOR WHEEZING OR SHORTNESSOF BREATH. 12/31/20  Yes Abi Barron MD   Nebulizer & Compressor machine For the administration of albuterol every 4 hours as needed for wheezing. Diagnosis code J45.31. 1/19/17  Yes Jhon Murillo NP   inhalational spacing device (SPACE CHAMBER PLUS) 1 Each by Does Not Apply route as needed. For use with albuterol inhaler. 1/16/17  Yes Jhon Murillo NP   albuterol (PROVENTIL VENTOLIN) 2.5 mg /3 mL (0.083 %) nebulizer solution 3 mL by Nebulization route every four (4) hours as needed for Wheezing. 1/16/17  Yes Jhon Murillo NP   divalproex DR (DEPAKOTE) 250 mg tablet Take 500 mg by mouth nightly. 1/15/15  Yes Provider, Historical   sennosides (SENNA) 8.6 mg cap Take 1 tablet by mouth nightly as needed. Yes Provider, Historical   hydrocortisone (HYCORT) 1 % ointment Apply  to affected area two (2) times a day. use thin layer 11/4/14  Yes Doris Hoffman PA   risperiDONE (RISPERDAL) 4 mg tablet Take 2 mg by mouth two (2) times a day. 3/15/14  Yes Provider, Historical   bisacodyl (DULCOLAX, BISACODYL,) 5 mg EC tablet Take 1 Tab by mouth daily as needed for Constipation. 3/6/14  Yes Abi Barron MD   dextromethorphan-guaiFENesin (ROBITUSSIN-DM)  mg/5 mL syrup Take 10 mL by mouth four (4) times daily as needed for Cough. 2/15/12  Yes Eve Ashford MD   pramoxine-mineral oil-zinc (TUCKS) 1-12.5 % rectal ointment by PeriANAL route every four (4) hours as needed for Hemorrhoids, Pain and Itching.  10/26/11  Yes Eve Ashford MD   lithium carbonate (ESKALITH) 300 mg capsule Take 300 mg by mouth two (2) times daily (with meals). Yes Provider, Historical   temazepam (RESTORIL) 15 mg capsule Take  by mouth nightly as needed. Yes Provider, Historical   magnesium hydroxide (MILK OF MAGNESIA) 400 mg/5 mL suspension Take 30 mL by mouth daily as needed. Yes Provider, Historical   aluminum & magnesium hydroxide-simethicone (MYLANTA MAXIMUM STRENGTH) 400-400-40 mg/5 mL suspension Take 10 mL by mouth every six (6) hours as needed. Yes Provider, Historical   ondansetron hcl (ZOFRAN) 4 mg tablet Take 4 mg by mouth every six (6) hours. Patient not taking: Reported on 2/2/2022    Provider, Historical      No Known Allergies    Review of Systems:  A comprehensive review of systems was negative except for that written in the History of Present Illness. Objective:      Physical Exam:  GENERAL: alert, cooperative, no distress, slowed mentation, appears stated age, EYE: negative findings: anicteric sclera, LYMPHATIC: Cervical, supraclavicular nodes normal. , THROAT & NECK: normal, LUNG: clear to auscultation bilaterally, HEART: regular rate and rhythm, ABDOMEN: Soft, NT, ND. There is a healed left paramedian surgical scar. There is possibly a healed surgical scar in the RLQ., EXTREMITIES:  no edema, SKIN: Normal., NEUROLOGIC: positive findings: Mentally retarded., PSYCHIATRIC: non focal    Assessment:     Symptomatic cholelithiasis. Plan:     Mr. Remington Strange' caregiver would like to have him have his GB removed to alleviate his symptoms. My office will obtain the records from the ER visit. I discussed the risks of the procedure including bleeding, infection, wound healing problems, persistent pain, blood clots, injury to the bowel, liver, or bile duct, and reaction to the prep, contrast, or local and general anesthetic. The caregiver understands the risks; any and all questions were answered to his satisfaction.   Once the diagnosis is confirmed, Mr. Remington Strange will be scheduled for an elective outpatient robotic-assisted laparoscopic cholecystectomy with firefly, possible open under general anesthesia. The patient was counseled at length about the risks of bonnie Covid-19 during their perioperative period and any recovery window from their procedure. The patient was made aware that bonnie Covid-19  may worsen their prognosis for recovering from their procedure and lend to a higher morbidity and/or mortality risk. All material risks, benefits, and reasonable alternatives including postponing the procedure were discussed. The patient does wish to proceed with the procedure at this time.

## 2022-02-10 RX ORDER — DOCUSATE SODIUM 100 MG/1
CAPSULE, LIQUID FILLED ORAL
Qty: 60 CAPSULE | Refills: 11 | Status: SHIPPED | OUTPATIENT
Start: 2022-02-10

## 2022-02-17 ENCOUNTER — HOSPITAL ENCOUNTER (OUTPATIENT)
Dept: PREADMISSION TESTING | Age: 37
Discharge: HOME | End: 2022-02-17
Payer: MEDICARE

## 2022-02-17 PROCEDURE — U0005 INFEC AGEN DETEC AMPLI PROBE: HCPCS

## 2022-02-18 LAB
SARS-COV-2, XPLCVT: NOT DETECTED
SOURCE, COVRS: NORMAL

## 2022-02-21 ENCOUNTER — TELEPHONE (OUTPATIENT)
Dept: SURGERY | Age: 37
End: 2022-02-21

## 2022-02-21 NOTE — TELEPHONE ENCOUNTER
Emanuel Little ([facility]) returned call to office verified patient using 2 patient identifiers. He would like to reschedule the surgery and any day will be fine. Informed will forward information to Dr. Sai Santiago.

## 2022-02-21 NOTE — TELEPHONE ENCOUNTER
Emanuel on [employer] has called and stated that he would like to go ahead and R/S surgery. Emanuel stated that he has called this number all day trying to get PT R/S and has not heard anything back yet. I stated to PT that I would pass this information along.

## 2022-02-21 NOTE — TELEPHONE ENCOUNTER
Received a message from the on call service that the care giver Yaneli Daigle was calling to confirm the time of the surgery. Called patient's caregiver Emanuel ([address] [place] [address] Road) verified using 2 patient identifiers. He states that the was calling to confirm the surgery time was at 11 am. Informed him that it is scheduled for 9 am.  He states that he had called on Friday to confirm but did not hear anything back from them about the time to arrive. He would need to call PAT regarding this and was given the number to contact them. Also contacted PAT spoke with Albertina Bosch to make her aware of this information. She informed that she will contact pre-op to make them aware of this.

## 2022-02-21 NOTE — TELEPHONE ENCOUNTER
Emanuel (on [employer], the caregiver) called in regards to PT'S surgery and stated that they need to R/S as the surgery was cancelled. Phone number was verified by calling it back. [phone]. Please return to get surgery R/S. Thank you.

## 2022-02-21 NOTE — TELEPHONE ENCOUNTER
Returned call to [facility]) verified using 2 patient identifiers. Confirmed that we spoke  this morning and that I would forward the message to Dr. Eric Pryor to review once he was out of the OR. Once a date has been chosen our surgery scheduler Jed Rizzo will contact him. Verified the number listed and that he was the person to give consent for the patient. He states that he is the one who gives/signs for him and the number listed is correct. He mentioned he has trouble getting into his VM and wanted to know if the information can be emailed to him. Informed him that I would have him speak with out PSR to setup mychart but he should still look out for the call from the . He voiced understandings and no other concerns were voiced.

## 2022-02-22 ENCOUNTER — TELEPHONE (OUTPATIENT)
Dept: SURGERY | Age: 37
End: 2022-02-22

## 2022-02-22 ENCOUNTER — NURSE TRIAGE (OUTPATIENT)
Dept: OTHER | Facility: CLINIC | Age: 37
End: 2022-02-22

## 2022-02-22 NOTE — TELEPHONE ENCOUNTER
Emanuel from [address] [place] [address] (on [facility]) for PT called in regards to getting  PT R/S for surgery. Emanuel verified PTS  . Emanuel stated he was trying to get PT R/S  For surgery and has not heard from anyone yet. I stated to the PT that our Surgery Scheduler will be reaching out to you, her name is Charmaine, so be on the look out for a call. Emanuel stated ok and call was disconnected.

## 2022-02-22 NOTE — TELEPHONE ENCOUNTER
Received call from Erika Hernandez at [facility] with Red Flag Complaint. Subjective: Caller, caregiver Miss Jeff Araujo,  states \"pt is very weak. Was fine yesterday. Has a wild look to him, eyes look wild. Not able to get up without assistance, not normal for him. Needs 2 people to help him get up and walk. \"     Current Symptoms: weakness, needs assistance. Needs 2 assistance today    Onset: started this morning    Associated Symptoms: NA    Pain Severity: no pain     Temperature: no fever    What has been tried: ate breakfast, drinking fluids    LMP: NA Pregnant: NA    Recommended disposition: call 911    Care advice provided, patient verbalizes understanding; denies any other questions or concerns; instructed to call back for any new or worsening symptoms. Patient/caller agrees to calling 911    Attention Provider: Thank you for allowing me to participate in the care of your patient. The patient was connected to triage in response to information provided to the [facility]. Please do not respond through this encounter as the response is not directed to a shared pool.     Reason for Disposition   SEVERE weakness (i.e., unable to walk or barely able to walk, requires support) and new-onset or worsening    Protocols used: WEAKNESS (GENERALIZED) AND FATIGUE-ADULT-OH

## 2022-02-23 ENCOUNTER — TELEPHONE (OUTPATIENT)
Dept: SURGERY | Age: 37
End: 2022-02-23

## 2022-02-23 NOTE — TELEPHONE ENCOUNTER
Caregiver Emanuel ([employer]) called and left VM while I was on the other line. Emanuel stated that he has not heard anything in 48 hrs as far as surgery goes and wanted a call with answers.

## 2022-02-23 NOTE — TELEPHONE ENCOUNTER
Returned call to Pia, verified  for PT. Per practice supervisor I was to return call and state that he should be on the lookout for a phone call to schedule by Friday. Emanuel was appreciative of phone call. Call ended.

## 2022-02-28 ENCOUNTER — TELEPHONE (OUTPATIENT)
Dept: SURGERY | Age: 37
End: 2022-02-28

## 2022-02-28 NOTE — TELEPHONE ENCOUNTER
Attempted to contact patient caregiver to give surgery information - no answer - voicemail left, no details. Letter sent.

## 2022-02-28 NOTE — TELEPHONE ENCOUNTER
[facility] using 2 patient identifiers. She wanted make MD aware that the patient had a shunt placed on 02/27 at [facility] and he is still in [address] [place] [address]. He is scheduled for lap cholecystectomy on 03/09. She made him aware that she will notify Dr. Selena Kapadia of this information but the surgery will probably be rescheduled. Dr. Selena Kapadia is aware of the above information and this patient will more than likely need to be cancelled/rescheduled. Call was placed to [facility] but went to  left message to call the office back.

## 2022-03-01 NOTE — TELEPHONE ENCOUNTER
Call placed to [facility]) verified using 2 patient identifiers. Informed following up regarding patient and the recent surgery at [facility]. He states that Mr. Sarah Allison was still in [facility] and he did not know all of the details about the surgery but they had to replace the shunt. He is aware that the surgery will need to be canceled.  He reports once Mr. Sarah Allison is doing better he will follow back up with the office

## 2022-03-18 PROBLEM — K80.20 SYMPTOMATIC CHOLELITHIASIS: Status: ACTIVE | Noted: 2022-02-02

## 2022-03-18 PROBLEM — R29.898 WEAKNESS OF BOTH LEGS: Status: ACTIVE | Noted: 2019-07-09

## 2022-03-18 PROBLEM — R26.89 NEED FOR ASSISTANCE DUE TO UNSTEADY GAIT: Status: ACTIVE | Noted: 2019-07-09

## 2022-03-19 PROBLEM — K56.2 INTESTINAL VOLVULUS (HCC): Status: ACTIVE | Noted: 2021-07-01
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
## 2022-03-19 PROBLEM — E66.9 CLASS 1 OBESITY IN ADULT: Status: ACTIVE | Noted: 2022-02-02

## 2022-04-14 ENCOUNTER — TELEPHONE (OUTPATIENT)
Dept: SURGERY | Age: 37
End: 2022-04-14

## 2022-04-14 NOTE — TELEPHONE ENCOUNTER
Returned call to [facility] who verified patient using 2 patient identifiers. She states that she is his CM and wanted to know what steps are needed to reschedule the surgery. She states that he is going to be d/c from [address] [place] [address] next week to a different [address] [place] [address] as he will no longer be returning to [facility]. Informed her that he would need to schedule another in office appointment to discuss. I inquired if someone would be accompanying him to the appointment. She states that he is his own guardian but can be if needed. Will make the Provider aware of this information and follow up with her.

## 2022-04-14 NOTE — TELEPHONE ENCOUNTER
Returned call to the  and verified patient using 2 patient identifiers. Informed her that Dr. Grace Jarrell reviewed the message and recommends that the patient have someone that is able to consent for him and be present with him at the appointment. She states that his Parents are not able to attend due to medical problems but she voiced understandings and will consult with the family and her Supervisor and get back with the office to schedule once she is able to have someone accompany him to the appointment.

## 2022-04-14 NOTE — TELEPHONE ENCOUNTER
Pt's  called asking what would be required to go ahead with getting the patient scheduled for surgery. Requested a call back please.

## 2022-04-28 ENCOUNTER — VIRTUAL VISIT (OUTPATIENT)
Dept: FAMILY MEDICINE CLINIC | Age: 37
End: 2022-04-28
Payer: MEDICARE

## 2022-04-28 DIAGNOSIS — F79 INTELLECTUAL DISABILITY: ICD-10-CM

## 2022-04-28 DIAGNOSIS — R26.89 NEED FOR ASSISTANCE DUE TO UNSTEADY GAIT: ICD-10-CM

## 2022-04-28 DIAGNOSIS — I10 PRIMARY HYPERTENSION: ICD-10-CM

## 2022-04-28 DIAGNOSIS — E11.9 CONTROLLED TYPE 2 DIABETES MELLITUS WITHOUT COMPLICATION, WITHOUT LONG-TERM CURRENT USE OF INSULIN (HCC): Primary | ICD-10-CM

## 2022-04-28 PROCEDURE — G8756 NO BP MEASURE DOC: HCPCS | Performed by: FAMILY MEDICINE

## 2022-04-28 PROCEDURE — G8510 SCR DEP NEG, NO PLAN REQD: HCPCS | Performed by: FAMILY MEDICINE

## 2022-04-28 PROCEDURE — G8427 DOCREV CUR MEDS BY ELIG CLIN: HCPCS | Performed by: FAMILY MEDICINE

## 2022-04-28 PROCEDURE — 3046F HEMOGLOBIN A1C LEVEL >9.0%: CPT | Performed by: FAMILY MEDICINE

## 2022-04-28 PROCEDURE — 99213 OFFICE O/P EST LOW 20 MIN: CPT | Performed by: FAMILY MEDICINE

## 2022-04-28 PROCEDURE — 2022F DILAT RTA XM EVC RTNOPTHY: CPT | Performed by: FAMILY MEDICINE

## 2022-04-28 PROCEDURE — G8417 CALC BMI ABV UP PARAM F/U: HCPCS | Performed by: FAMILY MEDICINE

## 2022-04-28 PROCEDURE — G0463 HOSPITAL OUTPT CLINIC VISIT: HCPCS | Performed by: FAMILY MEDICINE

## 2022-04-28 NOTE — PROGRESS NOTES
Consent: Rosetta Canavan, who was seen by synchronous (real-time) audio-video technology, and/or his healthcare decision maker, is aware that this patient-initiated, Telehealth encounter on 4/28/2022 is a billable service, with coverage as determined by his insurance carrier. He is aware that he may receive a bill and has provided verbal consent to proceed: YES-Consent obtained within past 12 months  712    Prior to Admission medications    Medication Sig Start Date End Date Taking? Authorizing Provider   docusate sodium (COLACE) 100 mg capsule TAKE 1 CAPSULE TWICE DAILY AS NEEDED FOR CONSTIPATION 2/10/22   Maria Isabel Drake MD   naproxen (NAPROSYN) 500 mg tablet Take 500 mg by mouth two (2) times daily as needed for Pain. Provider, Blanka   ibuprofen (MOTRIN) 400 mg tablet Take 1 Tablet by mouth every six (6) hours as needed for Pain. 1/10/22   Isaiah Napoles NP   acetaminophen (TYLENOL) 500 mg tablet Take 1 Tablet by mouth every six (6) hours as needed for Pain. 1/10/22   Isaiah Napoles NP   loperamide (IMMODIUM) 2 mg tablet Take 1 Tablet by mouth four (4) times daily as needed for Diarrhea. 1/10/22   Isaiah Napoles NP   loratadine (CLARITIN) 10 mg tablet TAKE 1 TABLET BY MOUTH DAILY FOR ALLERGIES 12/28/21   Noah Patrick MD   atenoloL (TENORMIN) 25 mg tablet TAKE 1 TABLET BY MOUTH DAILY 11/29/21   Maria Isabel Drake MD   fluticasone propionate (FLONASE) 50 mcg/actuation nasal spray INHALE 2 SPRAYS INTO EACH NOSTRIL ONCE DAILY 7/28/21   Maria Isabel Drake MD   Ventolin HFA 90 mcg/actuation inhaler INHALE 1 OR 2 PUFFS EVERY 4 HOURS AS NEEDED FOR WHEEZING OR SHORTNESSOF BREATH. 12/31/20   Maria Isabel Drake MD   Nebulizer & Compressor machine For the administration of albuterol every 4 hours as needed for wheezing. Diagnosis code J45.31. 1/19/17   Louanna Bernheim, NP   inhalational spacing device (SPACE CHAMBER PLUS) 1 Each by Does Not Apply route as needed. For use with albuterol inhaler.  1/16/17 Viktoria Garcia NP   albuterol (PROVENTIL VENTOLIN) 2.5 mg /3 mL (0.083 %) nebulizer solution 3 mL by Nebulization route every four (4) hours as needed for Wheezing. 1/16/17   Viktoria Garcia NP   divalproex DR (DEPAKOTE) 250 mg tablet Take 500 mg by mouth nightly. 1/15/15   Provider, Historical   ondansetron hcl (ZOFRAN) 4 mg tablet Take 4 mg by mouth every six (6) hours. Patient not taking: Reported on 2/2/2022    Provider, Historical   sennosides (SENNA) 8.6 mg cap Take 1 tablet by mouth nightly as needed. Provider, Historical   hydrocortisone (HYCORT) 1 % ointment Apply  to affected area two (2) times a day. use thin layer 11/4/14   MISTI Hurley   risperiDONE (RISPERDAL) 4 mg tablet Take 2 mg by mouth two (2) times a day. 3/15/14   Provider, Historical   bisacodyl (DULCOLAX, BISACODYL,) 5 mg EC tablet Take 1 Tab by mouth daily as needed for Constipation. 3/6/14   Qina Moss MD   dextromethorphan-guaiFENesin (ROBITUSSIN-DM)  mg/5 mL syrup Take 10 mL by mouth four (4) times daily as needed for Cough. 2/15/12   Zackery Cardoza MD   pramoxine-mineral oil-zinc (TUCKS) 1-12.5 % rectal ointment by PeriANAL route every four (4) hours as needed for Hemorrhoids, Pain and Itching. 10/26/11   Zackery Cardoza MD   lithium carbonate (ESKALITH) 300 mg capsule Take 300 mg by mouth two (2) times daily (with meals). Provider, Historical   temazepam (RESTORIL) 15 mg capsule Take  by mouth nightly as needed. Provider, Historical   magnesium hydroxide (MILK OF MAGNESIA) 400 mg/5 mL suspension Take 30 mL by mouth daily as needed. Provider, Historical   aluminum & magnesium hydroxide-simethicone (MYLANTA MAXIMUM STRENGTH) 400-400-40 mg/5 mL suspension Take 10 mL by mouth every six (6) hours as needed. Provider, Historical     No Known Allergies        Assessment & Plan:   Diagnoses and all orders for this visit:    1.  Controlled type 2 diabetes mellitus without complication, without long-term current use of insulin ([facility] [address]  Status post discharge from rehab for aspiration from hospital discharge. Is currently stable. 2. Primary hypertension  At [facility]  3. Intellectual disability    4. Need for assistance due to unsteady gait  Patient needs raised toilet seat and bed rails secondary to disability. 5. Lives in group home        Medication Side Effects and Warnings were discussed with patient  Patient Labs were reviewed and or requested:  Patient Past Records were reviewed and or requested              We discussed the expected course, resolution and complications of the diagnosis(es) in detail. Medication risks, benefits, costs, interactions, and alternatives were discussed as indicated. I advised him to contact the office if his condition worsens, changes or fails to improve as anticipated. He expressed understanding with the diagnosis(es) and plan. Amanda Massey, was evaluated through a synchronous (real-time) audio-video encounter. The patient (or guardian if applicable) is aware that this is a billable service, which includes applicable co-pays. This Virtual Visit was conducted with patient's (and/or legal guardian's) consent. The visit was conducted pursuant to the emergency declaration under the [address] [place] [address], [address] [place] [address] authority and the [employer] Act. Patient identification was verified, and a caregiver was present when appropriate. The patient was located in a state where the provider was licensed to provide care. Services were provided through a video synchronous discussion virtually to substitute for in-person clinic visit. Patient and provider were located at their individual homes. I have discussed the diagnosis with the patient and the intended plan as seen in the above orders. The patient understands and agrees with the plan.  The patient has received an after-visit summary and questions were answered concerning future plans. Medication Side Effects and Warnings were discussed with patient  Patient Labs were reviewed and or requested:  Patient Past Records were reviewed and or requested    Alfredito Mejias M.D. There are no Patient Instructions on file for this visit.

## 2022-05-04 ENCOUNTER — APPOINTMENT (OUTPATIENT)
Dept: CT IMAGING | Age: 37
End: 2022-05-04
Attending: EMERGENCY MEDICINE
Payer: MEDICARE

## 2022-05-04 ENCOUNTER — HOSPITAL ENCOUNTER (EMERGENCY)
Age: 37
Discharge: HOME | End: 2022-05-04
Attending: EMERGENCY MEDICINE
Payer: MEDICARE

## 2022-05-04 VITALS
WEIGHT: 162 LBS | SYSTOLIC BLOOD PRESSURE: 146 MMHG | BODY MASS INDEX: 27.66 KG/M2 | DIASTOLIC BLOOD PRESSURE: 82 MMHG | RESPIRATION RATE: 18 BRPM | HEIGHT: 64 IN | OXYGEN SATURATION: 95 % | HEART RATE: 93 BPM | TEMPERATURE: 99.5 F

## 2022-05-04 DIAGNOSIS — N42.9 PROSTATE IRREGULARITY: ICD-10-CM

## 2022-05-04 DIAGNOSIS — R10.84 ABDOMINAL PAIN, GENERALIZED: Primary | ICD-10-CM

## 2022-05-04 DIAGNOSIS — K59.00 CONSTIPATION, UNSPECIFIED CONSTIPATION TYPE: ICD-10-CM

## 2022-05-04 DIAGNOSIS — E87.0 HYPERNATREMIA: ICD-10-CM

## 2022-05-04 DIAGNOSIS — N30.90 CYSTITIS: ICD-10-CM

## 2022-05-04 LAB
ALBUMIN SERPL-MCNC: 2.3 G/DL (ref 3.5–5)
ALBUMIN/GLOB SERPL: 0.6 {RATIO} (ref 1.1–2.2)
ALP SERPL-CCNC: 127 U/L (ref 45–117)
ALT SERPL-CCNC: 13 U/L (ref 12–78)
ANION GAP SERPL CALC-SCNC: 6 MMOL/L (ref 5–15)
APPEARANCE UR: CLEAR
AST SERPL-CCNC: 11 U/L (ref 15–37)
BACTERIA URNS QL MICRO: NEGATIVE /HPF
BASOPHILS # BLD: 0 K/UL (ref 0–0.1)
BASOPHILS NFR BLD: 0 % (ref 0–1)
BILIRUB SERPL-MCNC: 0.4 MG/DL (ref 0.2–1)
BILIRUB UR QL: NEGATIVE
BUN SERPL-MCNC: 16 MG/DL (ref 6–20)
BUN/CREAT SERPL: 17 (ref 12–20)
CALCIUM SERPL-MCNC: 8.9 MG/DL (ref 8.5–10.1)
CHLORIDE SERPL-SCNC: 114 MMOL/L (ref 97–108)
CO2 SERPL-SCNC: 28 MMOL/L (ref 21–32)
COLOR UR: ABNORMAL
COMMENT, HOLDF: NORMAL
CREAT SERPL-MCNC: 0.92 MG/DL (ref 0.7–1.3)
DIFFERENTIAL METHOD BLD: ABNORMAL
EOSINOPHIL # BLD: 0 K/UL (ref 0–0.4)
EOSINOPHIL NFR BLD: 1 % (ref 0–7)
EPITH CASTS URNS QL MICRO: ABNORMAL /LPF
ERYTHROCYTE [DISTWIDTH] IN BLOOD BY AUTOMATED COUNT: 14.4 % (ref 11.5–14.5)
GLOBULIN SER CALC-MCNC: 4.1 G/DL (ref 2–4)
GLUCOSE SERPL-MCNC: 109 MG/DL (ref 65–100)
GLUCOSE UR STRIP.AUTO-MCNC: NEGATIVE MG/DL
HCT VFR BLD AUTO: 30.7 % (ref 36.6–50.3)
HGB BLD-MCNC: 9.2 G/DL (ref 12.1–17)
HGB UR QL STRIP: ABNORMAL
IMM GRANULOCYTES # BLD AUTO: 0.1 K/UL (ref 0–0.04)
IMM GRANULOCYTES NFR BLD AUTO: 1 % (ref 0–0.5)
KETONES UR QL STRIP.AUTO: ABNORMAL MG/DL
LEUKOCYTE ESTERASE UR QL STRIP.AUTO: ABNORMAL
LIPASE SERPL-CCNC: 35 U/L (ref 73–393)
LYMPHOCYTES # BLD: 1.4 K/UL (ref 0.8–3.5)
LYMPHOCYTES NFR BLD: 21 % (ref 12–49)
MCH RBC QN AUTO: 29 PG (ref 26–34)
MCHC RBC AUTO-ENTMCNC: 30 G/DL (ref 30–36.5)
MCV RBC AUTO: 96.8 FL (ref 80–99)
MONOCYTES # BLD: 0.6 K/UL (ref 0–1)
MONOCYTES NFR BLD: 9 % (ref 5–13)
NEUTS SEG # BLD: 4.6 K/UL (ref 1.8–8)
NEUTS SEG NFR BLD: 68 % (ref 32–75)
NITRITE UR QL STRIP.AUTO: NEGATIVE
NRBC # BLD: 0 K/UL (ref 0–0.01)
NRBC BLD-RTO: 0 PER 100 WBC
PH UR STRIP: 7 [PH] (ref 5–8)
PLATELET # BLD AUTO: 133 K/UL (ref 150–400)
PMV BLD AUTO: 8.7 FL (ref 8.9–12.9)
POTASSIUM SERPL-SCNC: 4.4 MMOL/L (ref 3.5–5.1)
PROT SERPL-MCNC: 6.4 G/DL (ref 6.4–8.2)
PROT UR STRIP-MCNC: 100 MG/DL
RBC # BLD AUTO: 3.17 M/UL (ref 4.1–5.7)
RBC #/AREA URNS HPF: ABNORMAL /HPF (ref 0–5)
SAMPLES BEING HELD,HOLD: NORMAL
SODIUM SERPL-SCNC: 148 MMOL/L (ref 136–145)
SP GR UR REFRACTOMETRY: 1.01 (ref 1–1.03)
UR CULT HOLD, URHOLD: NORMAL
UROBILINOGEN UR QL STRIP.AUTO: 2 EU/DL (ref 0.2–1)
WBC # BLD AUTO: 6.8 K/UL (ref 4.1–11.1)
WBC URNS QL MICRO: >100 /HPF (ref 0–4)

## 2022-05-04 PROCEDURE — 74177 CT ABD & PELVIS W/CONTRAST: CPT

## 2022-05-04 PROCEDURE — 87077 CULTURE AEROBIC IDENTIFY: CPT

## 2022-05-04 PROCEDURE — 87086 URINE CULTURE/COLONY COUNT: CPT

## 2022-05-04 PROCEDURE — 83690 ASSAY OF LIPASE: CPT

## 2022-05-04 PROCEDURE — 80053 COMPREHEN METABOLIC PANEL: CPT

## 2022-05-04 PROCEDURE — 81001 URINALYSIS AUTO W/SCOPE: CPT

## 2022-05-04 PROCEDURE — 74011000636 HC RX REV CODE- 636: Performed by: EMERGENCY MEDICINE

## 2022-05-04 PROCEDURE — 36415 COLL VENOUS BLD VENIPUNCTURE: CPT

## 2022-05-04 PROCEDURE — 85025 COMPLETE CBC W/AUTO DIFF WBC: CPT

## 2022-05-04 PROCEDURE — 87186 SC STD MICRODIL/AGAR DIL: CPT

## 2022-05-04 PROCEDURE — 74011250637 HC RX REV CODE- 250/637: Performed by: EMERGENCY MEDICINE

## 2022-05-04 PROCEDURE — 99285 EMERGENCY DEPT VISIT HI MDM: CPT

## 2022-05-04 RX ORDER — LEVOFLOXACIN 250 MG/1
250 TABLET ORAL DAILY
Qty: 5 TABLET | Refills: 0 | Status: SHIPPED | OUTPATIENT
Start: 2022-05-04 | End: 2022-05-09

## 2022-05-04 RX ORDER — LEVOFLOXACIN 250 MG/1
250 TABLET ORAL
Status: COMPLETED | OUTPATIENT
Start: 2022-05-04 | End: 2022-05-04

## 2022-05-04 RX ORDER — LEVOFLOXACIN 250 MG/1
250 TABLET ORAL DAILY
Qty: 5 TABLET | Refills: 0 | Status: SHIPPED | OUTPATIENT
Start: 2022-05-04 | End: 2022-05-04 | Stop reason: SDUPTHER

## 2022-05-04 RX ADMIN — IOPAMIDOL 100 ML: 755 INJECTION, SOLUTION INTRAVENOUS at 09:21

## 2022-05-04 RX ADMIN — LEVOFLOXACIN 250 MG: 250 TABLET, FILM COATED ORAL at 10:56

## 2022-05-04 NOTE — ED PROVIDER NOTES
The history is provided by the EMS personnel. The history is limited by a developmental delay. Abdominal Pain   The pain is located in the RLQ. Past Medical History:   Diagnosis Date    Allergic rhinitis due to other allergen 3/24/2010    Asthma     MR (mental retardation) 3/24/2010    Obesity (BMI 30.0-34. 9)     Type II or unspecified type diabetes mellitus without mention of complication, not stated as uncontrolled 3/24/2010       Past Surgical History:   Procedure Laterality Date    HX CSF SHUNT       shunt (Left side). Family History:   Family history unknown: Yes       Social History     Socioeconomic History    Marital status: SINGLE     Spouse name: Not on file    Number of children: Not on file    Years of education: Not on file    Highest education level: Not on file   Occupational History    Not on file   Tobacco Use    Smoking status: Never Smoker    Smokeless tobacco: Never Used   Substance and Sexual Activity    Alcohol use: No    Drug use: No    Sexual activity: Never   Other Topics Concern    Not on file   Social History Narrative    Not on file     Social Determinants of Health     Financial Resource Strain:     Difficulty of Paying Living Expenses: Not on file   Food Insecurity:     Worried About Running Out of Food in the Last Year: Not on file    Serena of Food in the Last Year: Not on file   Transportation Needs:     Lack of Transportation (Medical): Not on file    Lack of Transportation (Non-Medical):  Not on file   Physical Activity:     Days of Exercise per Week: Not on file    Minutes of Exercise per Session: Not on file   Stress:     Feeling of Stress : Not on file   Social Connections:     Frequency of Communication with Friends and Family: Not on file    Frequency of Social Gatherings with Friends and Family: Not on file    Attends Hinduism Services: Not on file    Active Member of Clubs or Organizations: Not on file    Attends Club or Organization Meetings: Not on file    Marital Status: Not on file   Intimate Partner Violence:     Fear of Current or Ex-Partner: Not on file    Emotionally Abused: Not on file    Physically Abused: Not on file    Sexually Abused: Not on file   Housing Stability:     Unable to Pay for Housing in the Last Year: Not on file    Number of [place] in the Last Year: Not on file    Unstable Housing in the Last Year: Not on file         ALLERGIES: Patient has no known allergies. Review of Systems   Unable to perform ROS: Patient nonverbal   Gastrointestinal: Positive for abdominal pain. Vitals:    05/04/22 0758 05/04/22 0804   BP: (!) 151/105    Pulse: 93    Resp: 18    Temp: 97.9 °F (36.6 °C) 99.5 °F (37.5 °C)   SpO2: 95%    Weight: 73.5 kg (162 lb)    Height: 5' 4\" (1.626 m)             Physical Exam  Vitals and nursing note reviewed. Constitutional:       General: He is not in acute distress. Appearance: He is well-developed. He is not diaphoretic. HENT:      Head: Normocephalic and atraumatic. Eyes:      Pupils: Pupils are equal, round, and reactive to light. Cardiovascular:      Rate and Rhythm: Normal rate and regular rhythm. Heart sounds: Normal heart sounds. No murmur heard. No friction rub. No gallop. Pulmonary:      Effort: Pulmonary effort is normal. No respiratory distress. Breath sounds: Normal breath sounds. No wheezing. Abdominal:      General: Bowel sounds are normal. There is no distension. Palpations: Abdomen is soft. Tenderness: There is no abdominal tenderness. There is no guarding or rebound. Musculoskeletal:         General: Normal range of motion. Cervical back: Normal range of motion and neck supple. Skin:     General: Skin is warm. Findings: No rash. Neurological:      Mental Status: He is alert. Mental status is at baseline.           MDM     This is a 25-year-old male with past medical history, review of systems, physical exam as above, presenting from his group home for complaints of abdominal pain. Patient has a history of MR, seizure disorder, there does not appear to be accompanying documentation indicating the patient's complaints, and he is not accompanied by staff. Upon arrival he appears to be awake and alert in no acute distress, noted to be hypertensive, afebrile without tachycardia, satting well on room air. He is reported to be nonverbal, and does not answer my questions, he appears to be frightened, does not appear to respond to pain when palpating his abdomen. Upon obtaining rectal temperature, nursing is informed he appears to have some firm stool in the rectal vault. Per report, patient indicated right lower quadrant pain during triage. Differential includes constipation, appendicitis. Plan to obtain CMP, CBC, UA, lipase, CT scan of the abdomen and pelvis. We will reassess, and make a disposition. Procedures    10:02 AM  Update:  Patient remains in no acute distress, UA with elevated whites, and leukocytes, CT scan concerning for cystitis, possible UTI will treat with Levaquin. Constipation noted on CT scan, as well as hypodense lesion of the prostate. Hyponatremia likely secondary to dehydration. Will encourage rehydration, as well as urology follow-up, primary care follow-up. Return precautions given. Discussed with group home staff.

## 2022-05-04 NOTE — ED TRIAGE NOTES
Pt arrives via EMS from group home w/ c/c of abdominal pain. Pt non-verbal @ baseline per EMS. Pt points to RLQ when asked where his pain is. Pt also had seizure @ 1800 last night per group home staff/EMS. Pt does have hx of seizures, no recent med changes.

## 2022-05-04 NOTE — ED NOTES
Staff from [facility] at bedside. Entered patient room, staff member states \"What is going on?\" Explained patient is being discharged and will have MD come to bedside to discuss findings of work up and plan of care. [facility] staff member states \"I can see his oxygen level is very low\". Patient SpO2 97% on room air. Staff member states \"Well it is now but wasn't earlier\". No report of low SpO2 reported by staff member prior to this interaction. Importance of adequate pleth explained to [facility] staff member and explained how without adequate pleth an inaccurate reading can occur. Patient showing no signs of respiratory distress and is 97% on room air.

## 2022-05-04 NOTE — ED NOTES
Patient calm and cooperative. Blood work and urine obtained. Patient lips dry and peeling. Lips and face cleaned with wipes.

## 2022-05-04 NOTE — PROGRESS NOTES
5/4/2022  11:28 AM  Case management note    Set up transport for 11:15, notified Kena Menjivar from group home of time.   [address]

## 2022-05-04 NOTE — ED NOTES
[facility] given report at bedside. Vital signs stable. Patient assisted staff with getting dressed by following commands rolling from side to side and assisting pulling up jeans.

## 2022-05-04 NOTE — ED NOTES
MD informed this RN to update patient pharmacy to be able to send antibiotic prescription. Inquired with group home staff member and replied with \"We [facility]. \" Inquired what [facility] is and is this a mail order pharmacy? Group home member replied \"We [facility]. That's what we use. They mail the medication to us\". Explained this RN is unfamiliar with [facility] and asked how to contact pharmacy. Staff member did not provide additional information. Notified MD and  and requested assistance.

## 2022-05-05 ENCOUNTER — DOCUMENTATION ONLY (OUTPATIENT)
Dept: FAMILY MEDICINE CLINIC | Age: 37
End: 2022-05-05

## 2022-05-05 NOTE — PROGRESS NOTES
[facility] for durable medical was put on [facility] desk to process

## 2022-05-05 NOTE — PROGRESS NOTES
[facility]N for durable medical was signed & faxed to [phone],ok,Copy placed in scan folder to be scanned to chart.

## 2022-05-07 LAB
BACTERIA SPEC CULT: ABNORMAL
BACTERIA SPEC CULT: ABNORMAL
CC UR VC: ABNORMAL
SERVICE CMNT-IMP: ABNORMAL

## 2022-05-24 ENCOUNTER — DOCUMENTATION ONLY (OUTPATIENT)
Dept: FAMILY MEDICINE CLINIC | Age: 37
End: 2022-05-24

## 2022-05-24 NOTE — PROGRESS NOTES
Raised toilet seat and bedrails order faxed to [facility] [place] at [phone]. Confirmed, scanned.